# Patient Record
Sex: MALE | Race: WHITE | NOT HISPANIC OR LATINO | Employment: FULL TIME | URBAN - METROPOLITAN AREA
[De-identification: names, ages, dates, MRNs, and addresses within clinical notes are randomized per-mention and may not be internally consistent; named-entity substitution may affect disease eponyms.]

---

## 2017-02-12 DIAGNOSIS — G47.00 INSOMNIA: Primary | ICD-10-CM

## 2017-02-12 DIAGNOSIS — G47.09 OTHER INSOMNIA: ICD-10-CM

## 2017-02-13 NOTE — TELEPHONE ENCOUNTER
zolpidem (AMBIEN) 10 MG tablet      Last Written Prescription Date:  11-21-16  Last Fill Quantity: 15,   # refills: 0  Last Office Visit : 11-21-16  Future Office visit:  none    Kathleen M Doege RN

## 2017-02-14 RX ORDER — ZOLPIDEM TARTRATE 10 MG/1
5 TABLET ORAL
Qty: 15 TABLET | Refills: 0
Start: 2017-02-14 | End: 2017-05-26

## 2017-05-26 ENCOUNTER — OFFICE VISIT (OUTPATIENT)
Dept: FAMILY MEDICINE | Facility: CLINIC | Age: 42
End: 2017-05-26

## 2017-05-26 VITALS
WEIGHT: 183.2 LBS | SYSTOLIC BLOOD PRESSURE: 122 MMHG | DIASTOLIC BLOOD PRESSURE: 83 MMHG | TEMPERATURE: 98 F | OXYGEN SATURATION: 97 % | RESPIRATION RATE: 18 BRPM | HEART RATE: 60 BPM | BODY MASS INDEX: 24.85 KG/M2

## 2017-05-26 DIAGNOSIS — R19.7 DIARRHEA, UNSPECIFIED TYPE: ICD-10-CM

## 2017-05-26 DIAGNOSIS — G47.09 OTHER INSOMNIA: ICD-10-CM

## 2017-05-26 DIAGNOSIS — K42.9 UMBILICAL HERNIA WITHOUT OBSTRUCTION AND WITHOUT GANGRENE: Primary | ICD-10-CM

## 2017-05-26 RX ORDER — ZOLPIDEM TARTRATE 10 MG/1
5 TABLET ORAL
Qty: 15 TABLET | Refills: 0 | Status: SHIPPED | OUTPATIENT
Start: 2017-05-26 | End: 2017-09-28

## 2017-05-26 ASSESSMENT — PAIN SCALES - GENERAL: PAINLEVEL: MILD PAIN (3)

## 2017-05-26 NOTE — PATIENT INSTRUCTIONS
[facility] Medication Refill Request Information:  * Please contact your pharmacy regarding ANY request for medication refills.  ** [facility] Prescription Fax = [phone]  * Please allow 3 business days for routine medication refills.  * Please allow 5 business days for controlled substance medication refills.     [facility] Test Result notification information:  *You will be notified with in 7-10 days of your appointment day regarding the results of your test.  If you are on MyChart you will be notified as soon as the provider has reviewed the results and signed off on them.    [facility] [phone]     General Surgery [phone] (4th Floor [facility] Building)

## 2017-05-26 NOTE — NURSING NOTE
Chief Complaint   Patient presents with     Hernia     Patient is here to discuss a possbile hernia.      Terri Zaldivar LPN at 2:08 PM on 5/26/2017.

## 2017-05-26 NOTE — PROGRESS NOTES
SUBJECTIVE:    Pt is a 41 year old male with pmh of     There is no problem list on file for this patient.      who is here for evaluation of had concerns including Hernia.    Here for a few things.  One, as infant had umbilical hernia, went away on its own. A few years ago had inguinal hernia. Now, he notes small umbilical protrusion--no pain or GI sx with.  Two, since age 18, frequent loose stools, about five times a day, no blood, no other GI sx, no wt loss, no systemic sx, had w/u in [place] before moved here, neg. Might be worse w/ daily, but occurs without dairy too. Tends to stool after eats.   Three, due for lipids. On Zocor.  Four, insomnia, ambien sparing prn helps, tolerated  Five, he has a three year old son (who has umbilical hernia), his son had diarrhea, three days ago Peter got diarrhea w/o other GI or systemic sx, better today    No Known Allergies        Current Outpatient Prescriptions   Medication Sig Dispense Refill     zolpidem (AMBIEN) 10 MG tablet Take 0.5 tablets (5 mg) by mouth nightly as needed for sleep 15 tablet 0     simvastatin (ZOCOR) 10 MG tablet Take 0.5 tablets (5 mg) by mouth At Bedtime Take one at bedtime. 90 tablet 3     hyoscyamine (LEVSIN/SL) 0.125 MG SL tablet Take 1 tablet (0.125 mg) by mouth every 4 hours as needed for cramping 30 tablet 0       Social History   Substance Use Topics     Smoking status: Never Smoker     Smokeless tobacco: Never Used     Alcohol use Yes      Comment: socially           OBJECTIVE:  /83  Pulse 60  Temp 98  F (36.7  C) (Oral)  Resp 18  Wt 83.1 kg (183 lb 3.2 oz)  SpO2 97%  BMI 24.85 kg/m2  GENERAL APPEARANCE: Alert, no acute distress. Well nourished, hydrated  ABDOMEN: soft, no organomegaly, masses or tenderness--very small umbilical hernia  NEURO: Alert, oriented, speech and mentation normal  PSYCHE: mentation appears normal, affect and mood normal    ASSESSMENT/PLAN:    Chronic diarrhea  Sprue tests  Offered GI referral, he just wants  to consider for now    Umbilical hernia: very small, he'd like to discuss w/ G Surg    High chol: labs    Insomnia: continue prn sparing Ambien    Self limited acute diarrhea (on top of chronic loose stools), resolved, likely viral from son, f/u prn            ANIBAL VELIZ MD

## 2017-05-26 NOTE — MR AVS SNAPSHOT
After Visit Summary   5/26/2017    Filippo Saavedra    MRN: [id number]           Patient Information     Date Of Birth          1975        Visit Information        Provider Department      5/26/2017 2:05 PM Douglas Horton MD [facility]        Today's Diagnoses     Umbilical hernia without obstruction and without gangrene    -  1    Other insomnia        Diarrhea, unspecified type          Care Instructions    [facility] Medication Refill Request Information:  * Please contact your pharmacy regarding ANY request for medication refills.  ** [facility] Prescription Fax = [phone]  * Please allow 3 business days for routine medication refills.  * Please allow 5 business days for controlled substance medication refills.     [facility] Test Result notification information:  *You will be notified with in 7-10 days of your appointment day regarding the results of your test.  If you are on MyChart you will be notified as soon as the provider has reviewed the results and signed off on them.    [facility] [phone]     General Surgery [phone] (4th Floor [facility])             Follow-ups after your visit        Additional Services     GENERAL SURG ADULT REFERRAL       Your provider has referred you to: [facility]: General Surgery Clinic [facility] [phone]   [email or web address]    Please be aware that coverage of these services is subject to the terms and limitations of your health insurance plan.  Call member services at your health plan with any benefit or coverage questions.      Please bring the following with you to your appointment:    (1) Any X-Rays, CTs or MRIs which have been performed.  Contact the facility where they were done to arrange for  prior to your scheduled appointment.   (2) List of current medications   (3) This referral request   (4) Any documents/labs given to you for this referral                   Your next 10 appointments already scheduled     May 30, 2017 10:15 AM CDT   LAB with  LAB   [facility] ([facility])    [address]  [address] [place] [address] [phone]           Patient must bring picture ID.  Patient should be prepared to give a urine specimen  Please do not eat 10-12 hours before your appointment if you are coming in fasting for labs on lipids, cholesterol, or glucose (sugar).  Pregnant women should follow their Care Team instructions. Water with medications is okay. Do not drink coffee or other fluids.   If you have concerns about taking  your medications, please ask at office or if scheduling via [employer], send a message by clicking on Secure Messaging, Message Your Care Team.              Future tests that were ordered for you today     Open Future Orders        Priority Expected Expires Ordered    CBC with platelets differential Routine 5/26/2017 6/9/2017 5/26/2017    Tissue transglutaminase rebekah IgA and IgG Routine 5/26/2017 5/26/2018 5/26/2017    Comprehensive metabolic panel Routine 5/26/2017 6/9/2017 5/26/2017    Lipid panel reflex to direct LDL Routine 5/26/2017 6/9/2017 5/26/2017            Who to contact     Please call your clinic at [phone] to:    Ask questions about your health    Make or cancel appointments    Discuss your medicines    Learn about your test results    Speak to your doctor   If you have compliments or concerns about an experience at your clinic, or if you wish to file a complaint, please contact [facility] Patient Relations at [phone] or email us at [email or web address]         Additional Information About Your Visit        [employer] Information     [employer] is an electronic gateway that provides easy, online access to your medical records. With [employer], you can request a clinic appointment, read your test results, renew a prescription or communicate with  your care team.     To sign up for [employer] visit the website at [email or web address]   You will be asked to enter the access code listed below, as well as some personal information. Please follow the directions to create your username and password.     Your access code is: [email or web address]  Expires: 2017  6:30 AM     Your access code will  in 90 days. If you need help or a new code, please contact your [facility] or call [phone] for assistance.        Care EveryWhere ID     This is your Care EveryWhere ID. This could be used by other organizations to access your Donna medical records  [id number]        Your Vitals Were     Pulse Temperature Respirations Pulse Oximetry BMI (Body Mass Index)       60 98  F (36.7  C) (Oral) 18 97% 24.85 kg/m2        Blood Pressure from Last 3 Encounters:   17 122/83   16 121/84   05/15/15 110/74    Weight from Last 3 Encounters:   17 83.1 kg (183 lb 3.2 oz)   16 83 kg (183 lb)   05/15/15 82.5 kg (181 lb 14.4 oz)              We Performed the Following     Endomysial Antibody IgA by [facility]     GENERAL SURG ADULT REFERRAL          Today's Medication Changes          These changes are accurate as of: 17  2:41 PM.  If you have any questions, ask your nurse or doctor.               Stop taking these medicines if you haven't already. Please contact your care team if you have questions.     azithromycin 250 MG tablet   Commonly known as:  ZITHROMAX   Stopped by:  Douglas Horton MD                Where to get your medicines      Some of these will need a paper prescription and others can be bought over the counter.  Ask your nurse if you have questions.     Bring a paper prescription for each of these medications     zolpidem 10 MG tablet                Primary Care Provider Office Phone # Fax #    Douglas Horton [phone]       [facility] [address] [place] [address]  [id number]        Thank you!     Thank you for choosing [facility]  for your care. Our goal is always to provide you with excellent care. Hearing back from our patients is one way we can continue to improve our services. Please take a few minutes to complete the written survey that you may receive in the mail after your visit with us. Thank you!             Your Updated Medication List - Protect others around you: Learn how to safely use, store and throw away your medicines at www.disposemymeds.org.          This list is accurate as of: 5/26/17  2:41 PM.  Always use your most recent med list.                   Brand Name Dispense Instructions for use    hyoscyamine 0.125 MG sublingual tablet    LEVSIN/SL    30 tablet    Take 1 tablet (0.125 mg) by mouth every 4 hours as needed for cramping       simvastatin 10 MG tablet    ZOCOR    90 tablet    Take 0.5 tablets (5 mg) by mouth At Bedtime Take one at bedtime.       zolpidem 10 MG tablet    AMBIEN    15 tablet    Take 0.5 tablets (5 mg) by mouth nightly as needed for sleep

## 2017-05-30 DIAGNOSIS — R19.7 DIARRHEA, UNSPECIFIED TYPE: ICD-10-CM

## 2017-05-30 LAB
ALBUMIN SERPL-MCNC: 4.1 G/DL (ref 3.4–5)
ALP SERPL-CCNC: 85 U/L (ref 40–150)
ALT SERPL W P-5'-P-CCNC: 29 U/L (ref 0–70)
ANION GAP SERPL CALCULATED.3IONS-SCNC: 6 MMOL/L (ref 3–14)
AST SERPL W P-5'-P-CCNC: 24 U/L (ref 0–45)
BASOPHILS # BLD AUTO: 0.1 10E9/L (ref 0–0.2)
BASOPHILS NFR BLD AUTO: 0.8 %
BILIRUB SERPL-MCNC: 0.4 MG/DL (ref 0.2–1.3)
BUN SERPL-MCNC: 12 MG/DL (ref 7–30)
CALCIUM SERPL-MCNC: 9 MG/DL (ref 8.5–10.1)
CHLORIDE SERPL-SCNC: 105 MMOL/L (ref 94–109)
CHOLEST SERPL-MCNC: 123 MG/DL
CO2 SERPL-SCNC: 30 MMOL/L (ref 20–32)
CREAT SERPL-MCNC: 1.05 MG/DL (ref 0.66–1.25)
DIFFERENTIAL METHOD BLD: NORMAL
EOSINOPHIL # BLD AUTO: 0.2 10E9/L (ref 0–0.7)
EOSINOPHIL NFR BLD AUTO: 2.5 %
ERYTHROCYTE [DISTWIDTH] IN BLOOD BY AUTOMATED COUNT: 13 % (ref 10–15)
GFR SERPL CREATININE-BSD FRML MDRD: 77 ML/MIN/1.7M2
GLUCOSE SERPL-MCNC: 95 MG/DL (ref 70–99)
HCT VFR BLD AUTO: 47.4 % (ref 40–53)
HDLC SERPL-MCNC: 37 MG/DL
HGB BLD-MCNC: 15.6 G/DL (ref 13.3–17.7)
IMM GRANULOCYTES # BLD: 0 10E9/L (ref 0–0.4)
IMM GRANULOCYTES NFR BLD: 0.3 %
LDLC SERPL CALC-MCNC: 72 MG/DL
LYMPHOCYTES # BLD AUTO: 2.6 10E9/L (ref 0.8–5.3)
LYMPHOCYTES NFR BLD AUTO: 43.5 %
MCH RBC QN AUTO: 28.7 PG (ref 26.5–33)
MCHC RBC AUTO-ENTMCNC: 32.9 G/DL (ref 31.5–36.5)
MCV RBC AUTO: 87 FL (ref 78–100)
MONOCYTES # BLD AUTO: 0.4 10E9/L (ref 0–1.3)
MONOCYTES NFR BLD AUTO: 6.4 %
NEUTROPHILS # BLD AUTO: 2.8 10E9/L (ref 1.6–8.3)
NEUTROPHILS NFR BLD AUTO: 46.5 %
NONHDLC SERPL-MCNC: 85 MG/DL
NRBC # BLD AUTO: 0 10*3/UL
NRBC BLD AUTO-RTO: 0 /100
PLATELET # BLD AUTO: 239 10E9/L (ref 150–450)
POTASSIUM SERPL-SCNC: 4 MMOL/L (ref 3.4–5.3)
PROT SERPL-MCNC: 7.2 G/DL (ref 6.8–8.8)
RBC # BLD AUTO: 5.43 10E12/L (ref 4.4–5.9)
SODIUM SERPL-SCNC: 141 MMOL/L (ref 133–144)
TRIGL SERPL-MCNC: 65 MG/DL
WBC # BLD AUTO: 6.1 10E9/L (ref 4–11)

## 2017-05-31 LAB
TTG IGA SER-ACNC: NORMAL U/ML
TTG IGG SER-ACNC: NORMAL U/ML

## 2017-06-02 LAB — ENDOMYSIUM IGA TITR SER IF: NORMAL {TITER}

## 2017-06-05 ENCOUNTER — TELEPHONE (OUTPATIENT)
Dept: SURGERY | Facility: CLINIC | Age: 42
End: 2017-06-05

## 2017-06-05 NOTE — TELEPHONE ENCOUNTER
Pre Visit Call and Assessment    Date of call:  06/05/2017    Phone numbers:  Home number on file [phone] (home)    Reached patient/confirmed appointment:  Yes  Patient care team/Primary provider:  Douglas Horton  Preferred outpatient pharmacy:    [facility] [address]  AT [facility]   [address]  [place] [address]  Phone: [phone] Fax: [phone]    Referred to:  Dr. Dirk Rios    Reason for visit:  Umbilical hernia     Problem List:  There is no problem list on file for this patient.      Allergies:   No Known Allergies    History:      No past medical history on file.    Past Surgical History:   Procedure Laterality Date     HERNIA REPAIR         No family history on file.    Social History   Substance Use Topics     Smoking status: Never Smoker     Smokeless tobacco: Never Used     Alcohol use Yes      Comment: socially       Patient instructions:    *  Bring outside medical records, images/disc, and/or studies

## 2017-06-06 ENCOUNTER — OFFICE VISIT (OUTPATIENT)
Dept: SURGERY | Facility: CLINIC | Age: 42
End: 2017-06-06

## 2017-06-06 VITALS
HEIGHT: 72 IN | SYSTOLIC BLOOD PRESSURE: 129 MMHG | HEART RATE: 73 BPM | DIASTOLIC BLOOD PRESSURE: 81 MMHG | WEIGHT: 183.6 LBS | TEMPERATURE: 99.4 F | OXYGEN SATURATION: 96 % | BODY MASS INDEX: 24.87 KG/M2

## 2017-06-06 DIAGNOSIS — K42.9 UMBILICAL HERNIA WITHOUT OBSTRUCTION AND WITHOUT GANGRENE: Primary | ICD-10-CM

## 2017-06-06 ASSESSMENT — PAIN SCALES - GENERAL: PAINLEVEL: NO PAIN (0)

## 2017-06-06 NOTE — PATIENT INSTRUCTIONS
Open Umbilical Hernia Repair Teaching Sheet      1.  Wound care:  Remove the gauze dressing 24 hours after surgery but leave the medical tape in place in place.  The tape should stay on for 5-7 days.  You may remove the Steri-Strips after one week.   Please wash your hands before touching your incisions or removing dressings.    2.  You may resume all of your home medications after surgery.  Please do not start Aspirin or blood thinners until the day after surgery.    3.  You may shower 24 hours after surgery.  Do not submerge yourself in water for 7 days (bath, whirlpool, hot tub, pool, lake, etc).      4.  Restrictions:  No lifting in excess of 20 pounds for 3-4 weeks from date of surgery.    5.  Pain management:  Apply ice pack to incision area for 24 hours protecting the skin with a cloth.  Take prescribed pain medication as directed and only as needed.  Please do not take any additional Acetaminophen or Tylenol products while taking narcotic pain medications.  We encourage the use of Ibuprofen, Advil, or Motrin after surgery except if you have an allergy, ulcer, kidney problems, or it is contraindicated by a provider.  A TAP Block may be recommended the day of surgery (see information sheet below).    6.  Our office will call you 2-4 days after your procedure to review post-op teaching, answer questions, and help arrange after surgery care.    7.  Watch for signs of infection:  Redness of the wound, drainage, increasing pain, and/or fever/chills (greater than 101 degrees F).    8.  Constipation:  Please take prescribed stool softener as directed.  You may stop taking it when you are no longer taking narcotic pain medications and your bowels have returned to normal.  If you become constipated it is safe to take an over-the-counter laxative as directed on the bottle.    9.  Driving:  You may drive  when you are no longer taking prescription pain medications  and you feel comfortable operating a vehicle.       10.  Diet:  You may resume your regular diet after surgery.      If you have questions or concerns please contact our office Monday through Friday during regular office hours at [phone].  If you are calling during nonbusiness hours, the weekend, or holiday please call the hospital  at [phone] and ask for the on-call General Surgeon.                Transversus Abdominis Plane (TAP) Pain Block    What is a TAP block?    A TAP block is a regional anesthesia procedure that can help you manage your pain after surgery.  TAP stands for Transversus Abdominis Plane, which is a muscle layer in your abdomen.  The TAP block numbs or blocks pain near the site of your surgery.      The numbing medication is similar to  Novocaine  which is often used by dentists.  After the procedure your abdomen may feel numb and will help you feel more comfortable after surgery.      What is the purpose of a nerve block?    To better manage your pain after surgery and to keep it from getting severe and out of control.    To block pain signals from the nerve and to help decrease after surgery pain.    To help you sleep, easily breathe deeply, walk, and visit with others.    How is the procedure done?    The block is an injection with a small needle in a muscle layer of your abdomen.  It is usually performed right before surgery in the Preoperative area.  It sometimes is done with sedation medication to lessen the pain of the injection.      It is performed with the use of an ultrasound machine, which assists the provider in seeing the correct muscle layer of your abdomen. The procedure takes between 5 and 15 minutes.    For some people it can be placed after you go to sleep in the Operating Room or at the end of surgery. It just depends on the type of surgery and your situation    What can I expect?    You may experience numbness, tingling, or a feeling of heaviness in your abdomen    Depending on the medication used you may  experience pain control for up to 72 hours after surgery    The TAP block typically does not relieve all of your surgical pain but decreases pain between 50-75% of what you typically would feel after surgery    Let your nurse know if you experience   o Numbness or tingling in areas other than where the injection was  o Blurry vision  o Ringing in your ears  o A metallic taste in your mouth

## 2017-06-06 NOTE — NURSING NOTE
No chief complaint on file.      Vitals:    06/06/17 1519   BP: 129/81   BP Location: Left arm   Patient Position: Chair   Cuff Size: Adult Regular   Pulse: 73   Temp: 99.4  F (37.4  C)   SpO2: 96%   Weight: 183 lb 9.6 oz   Height: 6'       Body mass index is 24.9 kg/(m^2).    Deandre Almaraz MA

## 2017-06-06 NOTE — PROGRESS NOTES
Filippo Saavedra is a 41 year old male with a 1 month history of an umbilical hernia with the following symptoms of lump.    Finding was not work related.  Onset did not occur with lifting.  Obstructive symptoms:  no  Urinary difficulties:  no  Chronic cough: no  Constipation:  no  Current level of activity:  Low, walks, has 3 year old at home.    Past medical history, medications, allergies, family history, and social history were reviewed with the patient. S/p primary repair inguinal hernia some years ago in [place]. Works as  at [employer].    ROS: 10 point review of systems negative except noted in HPI  PHYSICAL EXAM  General appearance- healthy, alert, and in no distress.  Skin- Skin color, texture, and turgor normal.  No rashes.  Neck- Neck is supple with no obvious adenopathy.  Lungs- Respiratory effort unlabored.  Gait- Normal.  Abdomen - soft non tender with small umbilical hernia  Impression:  Hernia, umbilical  Recommendation:  open surgery when convenient.    A full discussion regarding the alternatives, risks, goals, and potential complications for this surgery was completed today.  The patient understood that the potential problems included but are not limited to:  Infection, bleeding, hematoma, seroma, and recurrence.    The patient verbally expressed understanding, was given the opportunity for questions, and will hold off until later in summer.     Today the patient was instructed on the need for a preoperative H&P, NPO status prior to surgery, and the need to stop anticoagulants prior to surgery.  Additional educational material, soap, and instructions will be mailed out to the patients home.     The total time spent with this patient was 30 minutes.  Of this time, greater than 50% was spent counseling and coordinating care.      Lovenox:  No

## 2017-06-06 NOTE — MR AVS SNAPSHOT
After Visit Summary   6/6/2017    Filippo Saavedra    MRN: [id number]           Patient Information     Date Of Birth          1975        Visit Information        Provider Department      6/6/2017 3:30 PM Dirk Rios MD [facility]        Care Instructions    Open Umbilical Hernia Repair Teaching Sheet      1.  Wound care:  Remove the gauze dressing 24 hours after surgery but leave the medical tape in place in place.  The tape should stay on for 5-7 days.  You may remove the Steri-Strips after one week.   Please wash your hands before touching your incisions or removing dressings.    2.  You may resume all of your home medications after surgery.  Please do not start Aspirin or blood thinners until the day after surgery.    3.  You may shower 24 hours after surgery.  Do not submerge yourself in water for 7 days (bath, whirlpool, hot tub, pool, lake, etc).      4.  Restrictions:  No lifting in excess of 20 pounds for 3-4 weeks from date of surgery.    5.  Pain management:  Apply ice pack to incision area for 24 hours protecting the skin with a cloth.  Take prescribed pain medication as directed and only as needed.  Please do not take any additional Acetaminophen or Tylenol products while taking narcotic pain medications.  We encourage the use of Ibuprofen, Advil, or Motrin after surgery except if you have an allergy, ulcer, kidney problems, or it is contraindicated by a provider.  A TAP Block may be recommended the day of surgery (see information sheet below).    6.  Our office will call you 2-4 days after your procedure to review post-op teaching, answer questions, and help arrange after surgery care.    7.  Watch for signs of infection:  Redness of the wound, drainage, increasing pain, and/or fever/chills (greater than 101 degrees F).    8.  Constipation:  Please take prescribed stool softener as directed.  You may stop taking it when you are no longer taking narcotic pain  medications and your bowels have returned to normal.  If you become constipated it is safe to take an over-the-counter laxative as directed on the bottle.    9.  Driving:  You may drive  when you are no longer taking prescription pain medications  and you feel comfortable operating a vehicle.       10. Diet:  You may resume your regular diet after surgery.      If you have questions or concerns please contact our office Monday through Friday during regular office hours at [phone].  If you are calling during nonbusiness hours, the weekend, or holiday please call the hospital  at [phone] and ask for the on-call General Surgeon.                Transversus Abdominis Plane (TAP) Pain Block    What is a TAP block?    A TAP block is a regional anesthesia procedure that can help you manage your pain after surgery.  TAP stands for Transversus Abdominis Plane, which is a muscle layer in your abdomen.  The TAP block numbs or blocks pain near the site of your surgery.      The numbing medication is similar to  Novocaine  which is often used by dentists.  After the procedure your abdomen may feel numb and will help you feel more comfortable after surgery.      What is the purpose of a nerve block?    To better manage your pain after surgery and to keep it from getting severe and out of control.    To block pain signals from the nerve and to help decrease after surgery pain.    To help you sleep, easily breathe deeply, walk, and visit with others.    How is the procedure done?    The block is an injection with a small needle in a muscle layer of your abdomen.  It is usually performed right before surgery in the Preoperative area.  It sometimes is done with sedation medication to lessen the pain of the injection.      It is performed with the use of an ultrasound machine, which assists the provider in seeing the correct muscle layer of your abdomen. The procedure takes between 5 and 15 minutes.    For some people  it can be placed after you go to sleep in the Operating Room or at the end of surgery. It just depends on the type of surgery and your situation    What can I expect?    You may experience numbness, tingling, or a feeling of heaviness in your abdomen    Depending on the medication used you may experience pain control for up to 72 hours after surgery    The TAP block typically does not relieve all of your surgical pain but decreases pain between 50-75% of what you typically would feel after surgery    Let your nurse know if you experience   o Numbness or tingling in areas other than where the injection was  o Blurry vision  o Ringing in your ears  o A metallic taste in your mouth              Follow-ups after your visit        Who to contact     Please call your clinic at [phone] to:    Ask questions about your health    Make or cancel appointments    Discuss your medicines    Learn about your test results    Speak to your doctor   If you have compliments or concerns about an experience at your clinic, or if you wish to file a complaint, please contact [facility] Patient Relations at [phone] or email us at [email or web address]         Additional Information About Your Visit        [employer] Information     [employer] is an electronic gateway that provides easy, online access to your medical records. With [employer], you can request a clinic appointment, read your test results, renew a prescription or communicate with your care team.     To sign up for [employer] visit the website at [email or web address]   You will be asked to enter the access code listed below, as well as some personal information. Please follow the directions to create your username and password.     Your access code is: [email or web address]  Expires: 2017  6:30 AM     Your access code will  in 90 days. If you need help or a new code, please contact your [facility] or call  [phone] for assistance.        Care EveryWhere ID     This is your Care EveryWhere ID. This could be used by other organizations to access your [facility] medical records  [id number]        Your Vitals Were     Pulse Temperature Height Pulse Oximetry BMI (Body Mass Index)       73 99.4  F (37.4  C) 6' 96% 24.9 kg/m2        Blood Pressure from Last 3 Encounters:   06/06/17 129/81   05/26/17 122/83   11/21/16 121/84    Weight from Last 3 Encounters:   06/06/17 183 lb 9.6 oz   05/26/17 183 lb 3.2 oz   11/21/16 183 lb              Today, you had the following     No orders found for display       Primary Care Provider Office Phone # Fax #    Douglas Horton [phone]       [facility] [address] [place] [id number]        Thank you!     Thank you for choosing [facility]  for your care. Our goal is always to provide you with excellent care. Hearing back from our patients is one way we can continue to improve our services. Please take a few minutes to complete the written survey that you may receive in the mail after your visit with us. Thank you!             Your Updated Medication List - Protect others around you: Learn how to safely use, store and throw away your medicines at www.disposemymeds.org.          This list is accurate as of: 6/6/17  3:38 PM.  Always use your most recent med list.                   Brand Name Dispense Instructions for use    hyoscyamine 0.125 MG sublingual tablet    LEVSIN/SL    30 tablet    Take 1 tablet (0.125 mg) by mouth every 4 hours as needed for cramping       simvastatin 10 MG tablet    ZOCOR    90 tablet    Take 0.5 tablets (5 mg) by mouth At Bedtime Take one at bedtime.       zolpidem 10 MG tablet    AMBIEN    15 tablet    Take 0.5 tablets (5 mg) by mouth nightly as needed for sleep

## 2017-06-06 NOTE — LETTER
6/6/2017       RE: Fiilppo Saavedra  [address] [facility] [address]     Dear Colleague,    Thank you for referring your patient, Filippo Saavedra, to the [facility] GENERAL SURGERY at [facility]. Please see a copy of my visit note below.    Filippo Saavedra is a 41 year old male with a 1 month history of an umbilical hernia with the following symptoms of lump.    Finding was not work related.  Onset did not occur with lifting.  Obstructive symptoms:  no  Urinary difficulties:  no  Chronic cough: no  Constipation:  no  Current level of activity:  Low, walks, has 3 year old at home.    Past medical history, medications, allergies, family history, and social history were reviewed with the patient. S/p primary repair inguinal hernia some years ago in [place]. Works as  at [employer].    ROS: 10 point review of systems negative except noted in HPI  PHYSICAL EXAM  General appearance- healthy, alert, and in no distress.  Skin- Skin color, texture, and turgor normal.  No rashes.  Neck- Neck is supple with no obvious adenopathy.  Lungs- Respiratory effort unlabored.  Gait- Normal.  Abdomen - soft non tender with small umbilical hernia  Impression:  Hernia, umbilical  Recommendation:  open surgery when convenient.    A full discussion regarding the alternatives, risks, goals, and potential complications for this surgery was completed today.  The patient understood that the potential problems included but are not limited to:  Infection, bleeding, hematoma, seroma, and recurrence.    The patient verbally expressed understanding, was given the opportunity for questions, and will hold off until later in summer.     Today the patient was instructed on the need for a preoperative H&P, NPO status prior to surgery, and the need to stop anticoagulants prior to surgery.  Additional educational material, soap, and instructions will be mailed out to the patients home.     The total time spent with this  patient was 30 minutes.  Of this time, greater than 50% was spent counseling and coordinating care.      Lovenox:  No          Again, thank you for allowing me to participate in the care of your patient.      Sincerely,    Dirk Rios MD

## 2017-09-28 DIAGNOSIS — G47.09 OTHER INSOMNIA: ICD-10-CM

## 2017-09-29 RX ORDER — ZOLPIDEM TARTRATE 10 MG/1
5 TABLET ORAL
Qty: 15 TABLET | Refills: 0
Start: 2017-09-29 | End: 2018-01-12

## 2017-10-05 ENCOUNTER — CARE COORDINATION (OUTPATIENT)
Dept: SURGERY | Facility: CLINIC | Age: 42
End: 2017-10-05

## 2017-10-05 NOTE — PROGRESS NOTES
Patient met with Dr. Rios in June regarding an umbilical hernia.  Patient wanted to delay repair until the end of the summer.  Attempted to contact patient to inquire if he would like to schedule repair.  Briefly spoke with patient and he was unable to discuss at this time.

## 2018-01-12 DIAGNOSIS — G47.09 OTHER INSOMNIA: ICD-10-CM

## 2018-01-12 RX ORDER — ZOLPIDEM TARTRATE 10 MG/1
5 TABLET ORAL
Qty: 15 TABLET | Refills: 0
Start: 2018-01-12 | End: 2018-04-11

## 2018-01-15 DIAGNOSIS — E78.00 HIGH CHOLESTEROL: ICD-10-CM

## 2018-01-15 RX ORDER — SIMVASTATIN 10 MG
5 TABLET ORAL AT BEDTIME
Qty: 90 TABLET | Refills: 3 | Status: SHIPPED | OUTPATIENT
Start: 2018-01-15 | End: 2019-02-05

## 2018-04-11 DIAGNOSIS — G47.09 OTHER INSOMNIA: ICD-10-CM

## 2018-04-11 RX ORDER — ZOLPIDEM TARTRATE 10 MG/1
5 TABLET ORAL
Qty: 15 TABLET | Refills: 0
Start: 2018-04-11 | End: 2018-07-11

## 2018-07-11 DIAGNOSIS — G47.09 OTHER INSOMNIA: ICD-10-CM

## 2018-07-11 RX ORDER — ZOLPIDEM TARTRATE 10 MG/1
5 TABLET ORAL
Qty: 15 TABLET | Refills: 0
Start: 2018-07-11 | End: 2019-12-05

## 2019-02-05 DIAGNOSIS — E78.00 HIGH CHOLESTEROL: ICD-10-CM

## 2019-02-05 RX ORDER — SIMVASTATIN 10 MG
5 TABLET ORAL AT BEDTIME
Qty: 90 TABLET | Refills: 3 | Status: SHIPPED | OUTPATIENT
Start: 2019-02-05 | End: 2019-12-05

## 2019-12-05 ENCOUNTER — OFFICE VISIT (OUTPATIENT)
Dept: FAMILY MEDICINE | Facility: CLINIC | Age: 44
End: 2019-12-05
Payer: COMMERCIAL

## 2019-12-05 VITALS
WEIGHT: 180 LBS | OXYGEN SATURATION: 99 % | TEMPERATURE: 98.4 F | SYSTOLIC BLOOD PRESSURE: 138 MMHG | HEART RATE: 75 BPM | DIASTOLIC BLOOD PRESSURE: 89 MMHG | BODY MASS INDEX: 24.41 KG/M2

## 2019-12-05 DIAGNOSIS — K21.9 GASTROESOPHAGEAL REFLUX DISEASE WITHOUT ESOPHAGITIS: Primary | ICD-10-CM

## 2019-12-05 DIAGNOSIS — G47.09 OTHER INSOMNIA: ICD-10-CM

## 2019-12-05 DIAGNOSIS — E78.00 HIGH CHOLESTEROL: ICD-10-CM

## 2019-12-05 DIAGNOSIS — K58.9 IRRITABLE BOWEL SYNDROME, UNSPECIFIED TYPE: ICD-10-CM

## 2019-12-05 RX ORDER — ZOLPIDEM TARTRATE 10 MG/1
5 TABLET ORAL
Qty: 15 TABLET | Refills: 0 | Status: SHIPPED | OUTPATIENT
Start: 2019-12-05 | End: 2020-04-30

## 2019-12-05 RX ORDER — SIMVASTATIN 10 MG
5 TABLET ORAL AT BEDTIME
Qty: 90 TABLET | Refills: 3 | Status: SHIPPED | OUTPATIENT
Start: 2019-12-05 | End: 2020-12-08

## 2019-12-05 ASSESSMENT — PAIN SCALES - GENERAL: PAINLEVEL: NO PAIN (0)

## 2019-12-05 NOTE — PROGRESS NOTES
[facility]  Primary Care Center   Douglas Horton MD  12/05/2019      Chief Complaint:   Medication Request       History of Present Illness:   Filippo Saavedra is a 44 year old male with a history of insomnia, gastroesophageal reflux disease, and IBS who presents for a medication recheck. He brought blood work with him from this past May. His total cholesterol was 184, HDL 31, , Triglycerides 122, and Glucose 82 mg/dL. He continues to take simvastatin 5mg. He is not getting as much exercise as usual. He reports he is better about exercising during the summer because he is able to get outside, but has been struggling to keep up with it in the winter. He adds that his diet is okay, but could be better. His weight has been steady. He still has an umbilical hernia, although it only bothers him if he pokes it. He has decided to hold off on surgery. He occasionally has insomnia and takes Zolpidem 1-2x per month, although sometimes for 4-5 days at a time. He would like a refill of this prescription. Filippo reports he tried to wean himself off of the Zantac, but experienced severe chest pain due to heart burn, so is now taking it again. He occasionally takes Levsin for his IBS, which he has had since his 20s, and he would like a refill of this prescription. He notes that is mildly lactose intolerant, but his symptoms resolve 1-2 hours after eating     Review of Systems:   Pertinent items are noted in HPI or as in patient entered ROS below, remainder of complete ROS is negative.     Active Medications:       hyoscyamine (LEVSIN/SL) 0.125 MG sublingual tablet, Take 1 tablet (0.125 mg) by mouth every 4 hours as needed for cramping, Disp: 30 tablet, Rfl: 0     ranitidine (ZANTAC) 150 MG tablet, Take 150 mg by mouth, Disp: , Rfl:      simvastatin (ZOCOR) 10 MG tablet, Take 0.5 tablets (5 mg) by mouth At Bedtime Take one at bedtime., Disp: 90 tablet, Rfl: 3     zolpidem (AMBIEN) 10 MG tablet, Take 0.5 tablets (5 mg) by  mouth nightly as needed for sleep Patient needs to see primary provider for further refills., Disp: 15 tablet, Rfl: 0      Allergies:   Patient has no known allergies.      Past Medical History:  Umbilical hernia  Insomnia  Irritable bowel syndrome  Gastroesophageal reflux disease      Past Surgical History:  Hernia repair x1, inguinal    Family History:    Filippo reports that his Dad had an MI in his late 50s/early 60s. He was a smoker, but has been okay since the heart attack. Filippo's mom had ovarian cancer, but is healthy now. He has one older brother with psoriasis arthritis, so he has a lot of other health issues, including weight management and hypertension     Social History:   The patient is , a nonsmoker, and does socially consume alcohol. Filippo is a  and has a six year old son     Physical Exam:   /89 (BP Location: Left arm, Patient Position: Sitting, Cuff Size: Adult Regular)   Pulse 75   Temp 98.4  F (36.9  C) (Oral)   Wt 81.6 kg (180 lb)   SpO2 99%   BMI 24.41 kg/m     Constitutional: Alert. In no distress.  Head: Normocephalic.   ENT: Mucous membranes are moist.   Respiratory: Normal rate and effort.  Musculoskeletal: No gross deformities noted.   Psychiatric: Mentation appears normal. Normal affect.        Assessment and Plan:  Other insomnia  Uses sparingly with good relief  - zolpidem (AMBIEN) 10 MG tablet  Dispense: 15 tablet; Refill: 0    High cholesterol  He will work harder on diet and exercise. With his family history, he knows he needs to keep an eye on everything for cardiac risks  - simvastatin (ZOCOR) 10 MG tablet  Dispense: 90 tablet; Refill: 3    IBS (irritable bowel syndrome)  I did offer a GI referral as his symptoms go back to age 18, in case something could be done about this. He will think about it.  - hyoscyamine (LEVSIN/SL) 0.125 MG sublingual tablet  Dispense: 30 tablet; Refill: 0     Reflux  He has been using Zantac, I suggested he switch to  Pepcid    Follow-up: Return in about 1 year (around 12/5/2020).         Scribe Disclosure:  I, Mony Devika, am serving as a scribe to document services personally performed by Douglas Horton MD at this visit, based upon the provider's statements to me. All documentation has been reviewed by the aforementioned provider prior to being entered into the official medical record.       Portions of this medical record were completed by a scribe. UPON MY REVIEW AND AUTHENTICATION BY ELECTRONIC SIGNATURE, this confirms (a) I performed the applicable clinical services, and (b) the record is accurate.   LUKE Hudson

## 2019-12-05 NOTE — PATIENT INSTRUCTIONS
[facility] Medication Refill Request Information:  * Please contact your pharmacy regarding ANY request for medication refills.  ** [facility] Prescription Fax = [phone]  * Please allow 3 business days for routine medication refills.  * Please allow 5 business days for controlled substance medication refills.     [facility] Test Result notification information:  *You will be notified with in 7-10 days of your appointment day regarding the results of your test.  If you are on MyChart you will be notified as soon as the provider has reviewed the results and signed off on them.    [facility]: [phone]

## 2019-12-05 NOTE — NURSING NOTE
Chief Complaint   Patient presents with     Medication Request     pt here for medication refills       Jasmin Solano CMA at 2:23 PM on 12/5/2019.

## 2020-04-30 DIAGNOSIS — G47.09 OTHER INSOMNIA: ICD-10-CM

## 2020-04-30 RX ORDER — ZOLPIDEM TARTRATE 10 MG/1
5 TABLET ORAL
Qty: 15 TABLET | Refills: 0 | Status: SHIPPED | OUTPATIENT
Start: 2020-04-30 | End: 2022-06-08

## 2020-04-30 NOTE — TELEPHONE ENCOUNTER
Patient Requested  Ativan  Last Filled  12/05/2019  Last Office Visit  12/05/2019  Next Office Visit     Checked  04/30/2020    DX:     Pharmacy:     MAUDE BARNETT CMA at 10:26 AM on 4/30/2020.

## 2020-10-10 ENCOUNTER — IMMUNIZATION (OUTPATIENT)
Dept: NURSING | Facility: CLINIC | Age: 45
End: 2020-10-10
Payer: COMMERCIAL

## 2020-10-10 PROCEDURE — 90471 IMMUNIZATION ADMIN: CPT

## 2020-10-10 PROCEDURE — 90686 IIV4 VACC NO PRSV 0.5 ML IM: CPT

## 2020-12-07 ENCOUNTER — TELEPHONE (OUTPATIENT)
Dept: FAMILY MEDICINE | Facility: CLINIC | Age: 45
End: 2020-12-07

## 2020-12-07 DIAGNOSIS — E78.00 HIGH CHOLESTEROL: ICD-10-CM

## 2020-12-08 RX ORDER — SIMVASTATIN 10 MG
5 TABLET ORAL AT BEDTIME
Qty: 90 TABLET | Refills: 0 | Status: SHIPPED | OUTPATIENT
Start: 2020-12-08 | End: 2021-06-14

## 2020-12-08 NOTE — TELEPHONE ENCOUNTER
simvastatin (ZOCOR) 10 MG tablet  Take 0.5 tablets (5 mg) by mouth At Bedtime   Last Written Prescription Date:  12/5/2019  Last Fill Quantity: 90,   # refills: 3  Last Office Visit : 12/5/2019  Future Office visit:  None    Routing refill request to provider for review LDL last done 2017, annual appt due December 2020. 90 day sent

## 2021-06-11 DIAGNOSIS — E78.00 HIGH CHOLESTEROL: ICD-10-CM

## 2021-06-14 NOTE — TELEPHONE ENCOUNTER
simvastatin (ZOCOR) 10 MG tablet   Last Written Prescription Date:  12/8/2020  Last Fill Quantity: 90,   # refills: 0  Last Office Visit : 12/5/2019  Future Office visit:  None    Routing refill request to provider for review/approval because:  Second Request  Clarify order.   Is Pt taking a 1/2 tab daily?  Or taking 1 whole tablet daily?  In the chart in the order.  There is 2 sets of direction for the med.    Over due office visit and Labs    Dec 2019??  30 day pended clinic notified       Griselda Beaver RN  Central Triage Red Flags/Med Refills

## 2021-06-15 RX ORDER — SIMVASTATIN 10 MG
5 TABLET ORAL AT BEDTIME
Qty: 90 TABLET | Refills: 0 | Status: SHIPPED | OUTPATIENT
Start: 2021-06-15 | End: 2022-05-17

## 2021-07-19 DIAGNOSIS — G47.09 OTHER INSOMNIA: ICD-10-CM

## 2021-07-19 RX ORDER — ZOLPIDEM TARTRATE 10 MG/1
5 TABLET ORAL
Qty: 15 TABLET | Refills: 0 | Status: CANCELLED | OUTPATIENT
Start: 2021-07-19

## 2021-07-20 NOTE — TELEPHONE ENCOUNTER
Message left that patient needs to be seen for refills.    MAUDE BARNETT CMA at 8:15 AM on 7/20/2021.

## 2022-05-13 ENCOUNTER — MYC REFILL (OUTPATIENT)
Dept: FAMILY MEDICINE | Facility: CLINIC | Age: 47
End: 2022-05-13
Payer: COMMERCIAL

## 2022-05-13 DIAGNOSIS — E78.00 HIGH CHOLESTEROL: ICD-10-CM

## 2022-05-15 DIAGNOSIS — E78.00 HIGH CHOLESTEROL: ICD-10-CM

## 2022-05-16 RX ORDER — SIMVASTATIN 10 MG
5 TABLET ORAL AT BEDTIME
Qty: 30 TABLET | Status: CANCELLED | OUTPATIENT
Start: 2022-05-16

## 2022-05-16 NOTE — TELEPHONE ENCOUNTER
simvastatin (ZOCOR) 10 MG tablet      Last Written Prescription Date:  6/15/21  Last Fill Quantity: 90,   # refills: 0  Last Office Visit : 12/5/19  Future Office visit:  6/8/22    Routing refill request to provider for review/approval because:  >18 months since last visit  Overdue for LDL  Lab Test 05/30/17  1045   LDL 72     Nothing more recent in care everywhere nor media  Gap in therapy?  Taking?

## 2022-05-17 RX ORDER — SIMVASTATIN 10 MG
TABLET ORAL
Qty: 45 TABLET | Refills: 0 | Status: SHIPPED | OUTPATIENT
Start: 2022-05-17 | End: 2022-08-19

## 2022-06-08 ENCOUNTER — OFFICE VISIT (OUTPATIENT)
Dept: FAMILY MEDICINE | Facility: CLINIC | Age: 47
End: 2022-06-08
Payer: COMMERCIAL

## 2022-06-08 VITALS
SYSTOLIC BLOOD PRESSURE: 135 MMHG | OXYGEN SATURATION: 97 % | HEART RATE: 70 BPM | DIASTOLIC BLOOD PRESSURE: 85 MMHG | BODY MASS INDEX: 25.71 KG/M2 | HEIGHT: 72 IN | WEIGHT: 189.8 LBS

## 2022-06-08 DIAGNOSIS — G47.09 OTHER INSOMNIA: ICD-10-CM

## 2022-06-08 DIAGNOSIS — E78.00 HIGH CHOLESTEROL: ICD-10-CM

## 2022-06-08 DIAGNOSIS — K58.9 IRRITABLE BOWEL SYNDROME, UNSPECIFIED TYPE: ICD-10-CM

## 2022-06-08 DIAGNOSIS — R21 RASH: ICD-10-CM

## 2022-06-08 DIAGNOSIS — Z00.00 HEALTH CARE MAINTENANCE: Primary | ICD-10-CM

## 2022-06-08 DIAGNOSIS — K58.0 IRRITABLE BOWEL SYNDROME WITH DIARRHEA: ICD-10-CM

## 2022-06-08 PROCEDURE — 90715 TDAP VACCINE 7 YRS/> IM: CPT | Performed by: FAMILY MEDICINE

## 2022-06-08 PROCEDURE — 99396 PREV VISIT EST AGE 40-64: CPT | Mod: 25 | Performed by: FAMILY MEDICINE

## 2022-06-08 PROCEDURE — 90471 IMMUNIZATION ADMIN: CPT | Performed by: FAMILY MEDICINE

## 2022-06-08 RX ORDER — ZOLPIDEM TARTRATE 10 MG/1
5 TABLET ORAL
Qty: 15 TABLET | Refills: 0 | Status: SHIPPED | OUTPATIENT
Start: 2022-06-08 | End: 2022-10-06

## 2022-06-08 RX ORDER — FAMOTIDINE 20 MG/1
20 TABLET, FILM COATED ORAL
COMMUNITY

## 2022-06-08 RX ORDER — CHOLESTYRAMINE LIGHT 4 G/5.7G
POWDER, FOR SUSPENSION ORAL
Qty: 231 G | Refills: 3 | Status: SHIPPED | OUTPATIENT
Start: 2022-06-08 | End: 2024-08-12

## 2022-06-08 NOTE — NURSING NOTE
Filippo Saavedra is a 46 year old male patient that presents today in clinic for the following:    Chief Complaint   Patient presents with     Physical     Med review, physical     The patient's allergies and medications were reviewed as noted. A set of vitals were recorded as noted without incident. The patient does not have any other questions for the provider.    Benny Sandoval, EMT at 2:34 PM on 6/8/2022

## 2022-06-08 NOTE — PROGRESS NOTES
Assessment & Plan     High cholesterol  Recheck decide if change low dose zocor  - Lipid panel reflex to direct LDL Fasting; Future  - Comprehensive metabolic panel; Future    Health care maintenance  Due  - TDAP VACCINE (Adacel, Boostrix)  [3195209]  - Adult Gastro Ref - Procedure Only; Future    Irritable bowel syndrome with diarrhea    - cholestyramine light (PREVALITE) 4 GM powder; Use one scoop po qd  - hyoscyamine (LEVSIN/SL) 0.125 MG sublingual tablet; Take 1 tablet (0.125 mg) by mouth every 4 hours as needed for cramping    Other insomnia    - zolpidem (AMBIEN) 10 MG tablet; Take 0.5 tablets (5 mg) by mouth nightly as needed for sleep    Irritable bowel syndrome, unspecified type    - hyoscyamine (LEVSIN/SL) 0.125 MG sublingual tablet; Take 1 tablet (0.125 mg) by mouth every 4 hours as needed for cramping    Rash  Could be mild psoriasis  - Adult Dermatology Referral      43 minutes spent on the date of the encounter doing chart review, history and exam, documentation and further activities per the note    BMI:   Estimated body mass index is 25.74 kg/m  as calculated from the following:    Height as of this encounter: 1.829 m (6').    Weight as of this encounter: 86.1 kg (189 lb 12.8 oz).     Douglas Horton MD  [facility] IMANI Barkley is a 46 year old who presents for the following health issues     HPI   1-since 20s, IBS, mainly loose stools, triggered by po intake or stress, five/day loose, no blood melena, wt stable no other significant GI sx. Never did colonoscopy due anyway for age, unlikely significant findings given long term stable nature, unlikely collagenous colitis at his age of onset, never tried questran, discussed, failed bulk fiber trials in past, neg sprue labs  2-brother w/ psoriatic arthritis, he has a few scalp spots wonders if dandruff vs psoriasis, years ago pt had mono and typical psoriatic lesions on limbs then, o/w never.   No  relief otc dandruff shampoos.  Minimal itch, a few chronic small patches scalp  3-due for labs, on long term statin hi lipids  4-due for boostrix  5-Levsin might help IBS sx at times, keeps on hand  6-very sparing use ambien prn for insomnia, tolerated,  rvwd, helps/tolerated  No past medical history on file.  Past Surgical History:   Procedure Laterality Date     HERNIA REPAIR       Current Outpatient Medications   Medication     cholestyramine light (PREVALITE) 4 GM powder     famotidine (PEPCID) 20 MG tablet     hyoscyamine (LEVSIN/SL) 0.125 MG sublingual tablet     simvastatin (ZOCOR) 10 MG tablet     zolpidem (AMBIEN) 10 MG tablet     No current facility-administered medications for this visit.     No Known Allergies  Dad heart disease around age 60, mom ovary ca  Social History     Socioeconomic History     Marital status:      Spouse name: Not on file     Number of children: Not on file     Years of education: Not on file     Highest education level: Not on file   Occupational History     Not on file   Tobacco Use     Smoking status: Never Smoker     Smokeless tobacco: Never Used   Substance and Sexual Activity     Alcohol use: Yes     Comment: socially     Drug use: No     Sexual activity: Yes     Partners: Female   Other Topics Concern     Not on file   Social History Narrative     Not on file     Social Determinants of Health     Financial Resource Strain: Not on file   Food Insecurity: Not on file   Transportation Needs: Not on file   Physical Activity: Not on file   Stress: Not on file   Social Connections: Not on file   Intimate Partner Violence: Not on file   Housing Stability: Not on file      at , , one child    Review of Systems         Objective    /85 (BP Location: Right arm, Patient Position: Sitting, Cuff Size: Adult Regular)   Pulse 70   Ht 1.829 m (6')   Wt 86.1 kg (189 lb 12.8 oz)   SpO2 97%   BMI 25.74 kg/m    Body mass index is 25.74 kg/m .  Physical Exam    GENERAL: healthy, alert and no distress  EYES: Eyes grossly normal to inspection, PERRL and conjunctivae and sclerae normal  HENT: ear canals and TM's normal, nose and mouth without ulcers or lesions  NECK: no adenopathy, no asymmetry, masses, or scars and thyroid normal to palpation  RESP: lungs clear to auscultation - no rales, rhonchi or wheezes  CV: regular rate and rhythm, normal S1 S2, no S3 or S4, no murmur, click or rub, no peripheral edema and peripheral pulses strong  ABDOMEN: soft, nontender, no hepatosplenomegaly, no masses and bowel sounds normal   (male): normal male genitalia without lesions or urethral discharge, no hernia  MS: no gross musculoskeletal defects noted, no edema  SKIN: no suspicious lesions scalp several one cm red flaky patches hair intact  NEURO: Normal strength and tone, mentation intact and speech normal  PSYCH: mentation appears normal, affect normal/bright

## 2022-06-10 ENCOUNTER — LAB (OUTPATIENT)
Dept: LAB | Facility: CLINIC | Age: 47
End: 2022-06-10
Payer: COMMERCIAL

## 2022-06-10 ENCOUNTER — TELEPHONE (OUTPATIENT)
Dept: GASTROENTEROLOGY | Facility: CLINIC | Age: 47
End: 2022-06-10

## 2022-06-10 DIAGNOSIS — E78.00 HIGH CHOLESTEROL: ICD-10-CM

## 2022-06-10 LAB
ALBUMIN SERPL-MCNC: 4 G/DL (ref 3.4–5)
ALP SERPL-CCNC: 91 U/L (ref 40–150)
ALT SERPL W P-5'-P-CCNC: 46 U/L (ref 0–70)
ANION GAP SERPL CALCULATED.3IONS-SCNC: 6 MMOL/L (ref 3–14)
AST SERPL W P-5'-P-CCNC: 24 U/L (ref 0–45)
BILIRUB SERPL-MCNC: 0.4 MG/DL (ref 0.2–1.3)
BUN SERPL-MCNC: 13 MG/DL (ref 7–30)
CALCIUM SERPL-MCNC: 9.5 MG/DL (ref 8.5–10.1)
CHLORIDE BLD-SCNC: 107 MMOL/L (ref 94–109)
CHOLEST SERPL-MCNC: 161 MG/DL
CO2 SERPL-SCNC: 29 MMOL/L (ref 20–32)
CREAT SERPL-MCNC: 1.14 MG/DL (ref 0.66–1.25)
FASTING STATUS PATIENT QL REPORTED: YES
GFR SERPL CREATININE-BSD FRML MDRD: 79 ML/MIN/1.73M2
GLUCOSE BLD-MCNC: 101 MG/DL (ref 70–99)
HDLC SERPL-MCNC: 41 MG/DL
LDLC SERPL CALC-MCNC: 93 MG/DL
NONHDLC SERPL-MCNC: 120 MG/DL
POTASSIUM BLD-SCNC: 4.2 MMOL/L (ref 3.4–5.3)
PROT SERPL-MCNC: 7 G/DL (ref 6.8–8.8)
SODIUM SERPL-SCNC: 142 MMOL/L (ref 133–144)
TRIGL SERPL-MCNC: 134 MG/DL

## 2022-06-10 PROCEDURE — 80061 LIPID PANEL: CPT | Performed by: PATHOLOGY

## 2022-06-10 PROCEDURE — 36415 COLL VENOUS BLD VENIPUNCTURE: CPT | Performed by: PATHOLOGY

## 2022-06-10 PROCEDURE — 80053 COMPREHEN METABOLIC PANEL: CPT | Performed by: PATHOLOGY

## 2022-06-10 NOTE — TELEPHONE ENCOUNTER
Screening Questions  BlueKIND OF PREP RedLOCATION [review exclusion criteria] GreenSEDATION TYPE      1. Are you able to give consent for your medical care? Do you have a legal guardian or medical Power of ?  y (Sedation review/consideration needed)    2. Have you had a positive covid test in the last 90 days? n  a. If yes, what date?n    3. Are you active on mychart? y    4. What insurance is in the chart? [employer]     3.   Ordering/Referring Provider: Opal    4. BMI 25.7 [BMI OVER 40-EXTENDED PREP]  If greater than 40 review exclusion criteria [PAC APPT IF @ [facility]]        5.  Respiratory Screening :  [If yes to any of the following HOSPITAL setting only]     Do you use daily home oxygen? n    Do you have mod to severe Obstructive Sleep Apnea? n  [OKAY @ [facility] SH PH RI]   Do you have Pulmonary Hypertension? n     Do you have UNCONTROLLED asthma? n        6.   Have you had a heart or lung transplant? n      7.   Are you currently on dialysis? n [ If yes, G-PREP & HOSPITAL setting only]     8.   Do you have chronic kidney disease? n [ If yes, G-PREP ]    9.   Have you had a stroke or Transient ischemic attack (TIA - aka  mini stroke ) within 6 months?  n (If yes, please review exclusion criteria)    10.   In the past 6 months, have you had any heart related issues including cardiomyopathy or heart attack? n           If yes, did it require cardiac stenting or other implantable device? n      11.   Do you have any implantable devices in your body (pacemaker, defib, LVAD)? n (If yes, please review exclusion criteria)    12.   Do you take nitroglycerin? n           If yes, how often? n  (if yes, HOSPITAL setting ONLY)    13.   Are you currently taking any blood thinners? n           [IF YES, INFORM PATIENT TO FOLLOW UP W/ ORDERING PROVIDER FOR BRIDGING INSTRUCTIONS]     14.   Do you have a diagnosis of diabetes? n   [ If yes, G-PREP ]    15.   [FEMALES] Are you currently pregnant?     If yes, how many  weeks?     16.   Are you taking any prescription pain medications on a routine schedule?  n  [ If yes, EXTENDED PREP.] [If yes, MAC]    17.   Do you have any chemical dependencies such as alcohol, street drugs, or methadone?  n [If yes, MAC]    18.   Do you have any history of post-traumatic stress syndrome, severe anxiety or history of psychosis?  n  [If yes, MAC]    19.   Do you transfer independently?  y    20.  On a regular basis do you go 3-5 days between bowel movements? n   [ If yes, EXTENDED PREP.]    21.   Preferred LOCAL Pharmacy for Pre Prescription   [id number] [address]  AT [facility] [address]      Scheduling Details      Caller : Filippo Saavedra  (Please ask for phone number if not scheduled by patient)    Type of Procedure Scheduled: Colon  Which Colonoscopy Prep was Sent?: Mprep  BRUCE CF PATIENTS & GROEN'S PATIENTS REQUIRE EXTENDED PREP  Surgeon:Janny  Date of Procedure: 8/30  [place] [facility] [place] Type: CS  Conscious Sedation- Needs  for 6 hours after the procedure  MAC/General-Needs  for 24 hours after procedure    Pre-op Required at [facility] [place], [place] and OR for MAC sedation: n  (advise patient they will need a pre-op prior to procedure -)      Informed patient they will need an adult  y  Cannot take any type of public or medical transportation alone    Pre-Procedure Covid test to be completed [facility] or Externally: y    Confirmed Nurse will call to complete assessment y    Additional comments:

## 2022-08-16 DIAGNOSIS — E78.00 HIGH CHOLESTEROL: ICD-10-CM

## 2022-08-19 ENCOUNTER — TELEPHONE (OUTPATIENT)
Dept: GASTROENTEROLOGY | Facility: CLINIC | Age: 47
End: 2022-08-19

## 2022-08-19 DIAGNOSIS — Z12.11 ENCOUNTER FOR SCREENING COLONOSCOPY: Primary | ICD-10-CM

## 2022-08-19 RX ORDER — SIMVASTATIN 10 MG
5 TABLET ORAL AT BEDTIME
Qty: 45 TABLET | Refills: 3 | Status: SHIPPED | OUTPATIENT
Start: 2022-08-19 | End: 2024-03-01

## 2022-08-19 RX ORDER — BISACODYL 5 MG/1
TABLET, DELAYED RELEASE ORAL
Qty: 4 TABLET | Refills: 0 | Status: SHIPPED | OUTPATIENT
Start: 2022-08-19

## 2022-08-19 NOTE — TELEPHONE ENCOUNTER
Pre assessment questions completed for upcoming Colonoscopy procedure scheduled on 8/30/22    COVID policy reviewed. Patient to complete rapid antigen test one to two days before their scheduled procedure. Patient to bring photo of the results when they come in for their procedure.    Reviewed procedural arrival time 0800 and facility location [facility] [place].    Designated  policy reviewed. Instructed to have someone stay 6 hours post procedure.     Reviewed  prep instructions with patient. No fiber/iron supplements or foods that contain nuts/seeds 7 days prior to procedure.     Anticoagulation/blood thinners? None    Electronic implanted devices? None    Patient verbalized understanding and had no questions or concerns at this time.    Ale So RN

## 2022-08-19 NOTE — TELEPHONE ENCOUNTER
Patient scheduled for Colonoscopy on 8/30/22.     Discuss at home test option.     Arrival time: 0800    [place] [facility] [place]edation type: CS    Indication for procedure: Screening    Anticoagulations? None     Bowel prep recommendation: GoLytely    GoLytely prep sent to [facility] #[id number] - [place], [place] - [address] [place] [address] AT [place] [address] pharmacy. Prep instructions sent via [email or web address]    Pre visit planning completed.    Ale So RN

## 2022-08-29 ENCOUNTER — ANESTHESIA EVENT (OUTPATIENT)
Dept: SURGERY | Facility: AMBULATORY SURGERY CENTER | Age: 47
End: 2022-08-29
Payer: COMMERCIAL

## 2022-08-29 NOTE — ANESTHESIA PREPROCEDURE EVALUATION
Anesthesia Pre-Procedure Evaluation    Patient: Filippo Saavedra   MRN: [id number] : 1975        Procedure : Procedure(s):  COLONOSCOPY          No past medical history on file.   Past Surgical History:   Procedure Laterality Date     HERNIA REPAIR        No Known Allergies   Social History     Tobacco Use     Smoking status: Never Smoker     Smokeless tobacco: Never Used   Substance Use Topics     Alcohol use: Yes     Comment: socially      Wt Readings from Last 1 Encounters:   22 86.1 kg (189 lb 12.8 oz)           Physical Exam    Airway        Mallampati: I   TM distance: > 3 FB   Neck ROM: full   Mouth opening: > 3 cm    Respiratory Devices and Support         Dental  no notable dental history         Cardiovascular   cardiovascular exam normal          Pulmonary   pulmonary exam normal                OUTSIDE LABS:  CBC:   Lab Results   Component Value Date    WBC 6.1 2017    WBC 7.7 2012    HGB 15.6 2017    HGB 17.3 2012    HCT 47.4 2017    HCT 50.5 2012     2017     2012     BMP:   Lab Results   Component Value Date     06/10/2022     2017    POTASSIUM 4.2 06/10/2022    POTASSIUM 4.0 2017    CHLORIDE 107 06/10/2022    CHLORIDE 105 2017    CO2 29 06/10/2022    CO2 30 2017    BUN 13 06/10/2022    BUN 12 2017    CR 1.14 06/10/2022    CR 1.05 2017     (H) 06/10/2022    GLC 95 2017     COAGS: No results found for: PTT, INR, FIBR  POC: No results found for: BGM, HCG, HCGS  HEPATIC:   Lab Results   Component Value Date    ALBUMIN 4.0 06/10/2022    PROTTOTAL 7.0 06/10/2022    ALT 46 06/10/2022    AST 24 06/10/2022    ALKPHOS 91 06/10/2022    BILITOTAL 0.4 06/10/2022     OTHER:   Lab Results   Component Value Date    RODRIGO 9.5 06/10/2022       Anesthesia Plan    ASA Status:  2   NPO Status:  NPO Appropriate    Anesthesia Type: MAC.     - Reason for MAC: straight local not clinically  adequate   Induction: Intravenous, Propofol.   Maintenance: TIVA.        Consents    Anesthesia Plan(s) and associated risks, benefits, and realistic alternatives discussed. Questions answered and patient/representative(s) expressed understanding.    - Discussed:     - Discussed with:  Patient      - Extended Intubation/Ventilatory Support Discussed: No.      - Patient is DNR/DNI Status: No    Use of blood products discussed: No .     Postoperative Care       PONV prophylaxis: Ondansetron (or other 5HT-3), Background Propofol Infusion     Comments:           H&P reviewed: Unable to attach H&P to encounter due to EHR limitations. H&P Update: appropriate H&P reviewed, patient examined. No interval changes since H&P (within 30 days).         Navin Clark MD

## 2022-08-30 ENCOUNTER — ANESTHESIA (OUTPATIENT)
Dept: SURGERY | Facility: AMBULATORY SURGERY CENTER | Age: 47
End: 2022-08-30
Payer: COMMERCIAL

## 2022-08-30 ENCOUNTER — HOSPITAL ENCOUNTER (OUTPATIENT)
Facility: AMBULATORY SURGERY CENTER | Age: 47
Discharge: HOME | End: 2022-08-30
Attending: INTERNAL MEDICINE
Payer: COMMERCIAL

## 2022-08-30 VITALS
WEIGHT: 183 LBS | RESPIRATION RATE: 16 BRPM | BODY MASS INDEX: 24.79 KG/M2 | HEART RATE: 68 BPM | DIASTOLIC BLOOD PRESSURE: 70 MMHG | TEMPERATURE: 96.5 F | SYSTOLIC BLOOD PRESSURE: 111 MMHG | OXYGEN SATURATION: 99 % | HEIGHT: 72 IN

## 2022-08-30 VITALS — HEART RATE: 74 BPM

## 2022-08-30 LAB — COLONOSCOPY: NORMAL

## 2022-08-30 PROCEDURE — 88305 TISSUE EXAM BY PATHOLOGIST: CPT | Mod: 26 | Performed by: PATHOLOGY

## 2022-08-30 PROCEDURE — 88305 TISSUE EXAM BY PATHOLOGIST: CPT | Mod: TC | Performed by: INTERNAL MEDICINE

## 2022-08-30 PROCEDURE — 45380 COLONOSCOPY AND BIOPSY: CPT | Mod: 33

## 2022-08-30 RX ORDER — PROCHLORPERAZINE MALEATE 10 MG
10 TABLET ORAL EVERY 6 HOURS PRN
Status: CANCELLED | OUTPATIENT
Start: 2022-08-30

## 2022-08-30 RX ORDER — FLUMAZENIL 0.1 MG/ML
0.2 INJECTION, SOLUTION INTRAVENOUS
Status: CANCELLED | OUTPATIENT
Start: 2022-08-30 | End: 2022-08-30

## 2022-08-30 RX ORDER — NALOXONE HYDROCHLORIDE 0.4 MG/ML
0.4 INJECTION, SOLUTION INTRAMUSCULAR; INTRAVENOUS; SUBCUTANEOUS
Status: CANCELLED | OUTPATIENT
Start: 2022-08-30

## 2022-08-30 RX ORDER — NALOXONE HYDROCHLORIDE 0.4 MG/ML
0.2 INJECTION, SOLUTION INTRAMUSCULAR; INTRAVENOUS; SUBCUTANEOUS
Status: CANCELLED | OUTPATIENT
Start: 2022-08-30

## 2022-08-30 RX ORDER — PROPOFOL 10 MG/ML
INJECTION, EMULSION INTRAVENOUS CONTINUOUS PRN
Status: DISCONTINUED | OUTPATIENT
Start: 2022-08-30 | End: 2022-08-30

## 2022-08-30 RX ORDER — LIDOCAINE HYDROCHLORIDE 20 MG/ML
INJECTION, SOLUTION INFILTRATION; PERINEURAL PRN
Status: DISCONTINUED | OUTPATIENT
Start: 2022-08-30 | End: 2022-08-30

## 2022-08-30 RX ORDER — ONDANSETRON 2 MG/ML
4 INJECTION INTRAMUSCULAR; INTRAVENOUS
Status: DISCONTINUED | OUTPATIENT
Start: 2022-08-30 | End: 2022-08-31 | Stop reason: HOSPADM

## 2022-08-30 RX ORDER — ONDANSETRON 2 MG/ML
4 INJECTION INTRAMUSCULAR; INTRAVENOUS EVERY 6 HOURS PRN
Status: CANCELLED | OUTPATIENT
Start: 2022-08-30

## 2022-08-30 RX ORDER — LIDOCAINE 40 MG/G
CREAM TOPICAL
Status: DISCONTINUED | OUTPATIENT
Start: 2022-08-30 | End: 2022-08-31 | Stop reason: HOSPADM

## 2022-08-30 RX ORDER — PROPOFOL 10 MG/ML
INJECTION, EMULSION INTRAVENOUS PRN
Status: DISCONTINUED | OUTPATIENT
Start: 2022-08-30 | End: 2022-08-30

## 2022-08-30 RX ORDER — SODIUM CHLORIDE, SODIUM LACTATE, POTASSIUM CHLORIDE, CALCIUM CHLORIDE 600; 310; 30; 20 MG/100ML; MG/100ML; MG/100ML; MG/100ML
INJECTION, SOLUTION INTRAVENOUS CONTINUOUS
Status: DISCONTINUED | OUTPATIENT
Start: 2022-08-30 | End: 2022-08-31 | Stop reason: HOSPADM

## 2022-08-30 RX ORDER — ONDANSETRON 4 MG/1
4 TABLET, ORALLY DISINTEGRATING ORAL EVERY 6 HOURS PRN
Status: CANCELLED | OUTPATIENT
Start: 2022-08-30

## 2022-08-30 RX ORDER — DIPHENHYDRAMINE HCL 25 MG
25 TABLET ORAL
COMMUNITY

## 2022-08-30 RX ADMIN — PROPOFOL 75 MCG/KG/MIN: 10 INJECTION, EMULSION INTRAVENOUS at 09:46

## 2022-08-30 RX ADMIN — PROPOFOL 40 MG: 10 INJECTION, EMULSION INTRAVENOUS at 09:27

## 2022-08-30 RX ADMIN — SODIUM CHLORIDE, SODIUM LACTATE, POTASSIUM CHLORIDE, CALCIUM CHLORIDE: 600; 310; 30; 20 INJECTION, SOLUTION INTRAVENOUS at 08:41

## 2022-08-30 RX ADMIN — PROPOFOL 60 MG: 10 INJECTION, EMULSION INTRAVENOUS at 09:19

## 2022-08-30 RX ADMIN — LIDOCAINE HYDROCHLORIDE 100 MG: 20 INJECTION, SOLUTION INFILTRATION; PERINEURAL at 09:17

## 2022-08-30 RX ADMIN — PROPOFOL 150 MCG/KG/MIN: 10 INJECTION, EMULSION INTRAVENOUS at 09:17

## 2022-08-30 NOTE — ANESTHESIA CARE TRANSFER NOTE
Patient: Filippo Saavedra    Procedure: Procedure(s):  COLONOSCOPY, WITH POLYPECTOMY AND BIOPSY       Diagnosis: Health care maintenance [Z00.00]  Diagnosis Additional Information: No value filed.    Anesthesia Type:   MAC     Note:    Oropharynx: spontaneously breathing  Level of Consciousness: awake  Oxygen Supplementation: room air    Independent Airway: airway patency satisfactory and stable  Dentition: dentition unchanged  Vital Signs Stable: post-procedure vital signs reviewed and stable  Report to RN Given: handoff report given  Patient transferred to: Phase II    Handoff Report: Identifed the Patient, Identified the Reponsible Provider, Reviewed the pertinent medical history, Discussed the surgical course, Reviewed Intra-OP anesthesia mangement and issues during anesthesia, Set expectations for post-procedure period and Allowed opportunity for questions and acknowledgement of understanding      Vitals:  Vitals Value Taken Time   /70 08/30/22 1012   Temp 35.8  C (96.5  F) 08/30/22 1012   Pulse 68 08/30/22 1012   Resp 16 08/30/22 1012   SpO2 99 % 08/30/22 1012       Electronically Signed By: CAT Williamson CRNA  August 30, 2022  10:14 AM

## 2022-08-30 NOTE — ANESTHESIA POSTPROCEDURE EVALUATION
Patient: Filippo Saavedra    Procedure: Procedure(s):  COLONOSCOPY, WITH POLYPECTOMY AND BIOPSY       Anesthesia Type:  MAC    Note:  Disposition: Outpatient   Postop Pain Control: Uneventful            Sign Out: Well controlled pain   PONV:    Neuro/Psych: Uneventful            Sign Out: Acceptable/Baseline neuro status   Airway/Respiratory: Uneventful            Sign Out: Acceptable/Baseline resp. status   CV/Hemodynamics: Uneventful            Sign Out: Acceptable CV status; No obvious hypovolemia; No obvious fluid overload   Other NRE:    DID A NON-ROUTINE EVENT OCCUR?            Last vitals:  Vitals Value Taken Time   /70 08/30/22 1012   Temp 35.8  C (96.5  F) 08/30/22 1012   Pulse 68 08/30/22 1012   Resp 16 08/30/22 1012   SpO2 99 % 08/30/22 1012       Electronically Signed By: Navin Clark MD  August 30, 2022  12:33 PM

## 2022-08-30 NOTE — H&P
Filippo Sierran [facility]  [id number]  male  47 year old      Reason for procedure/surgery: Screening for colorectal cancer    There is no problem list on file for this patient.      Past Surgical History:    Past Surgical History:   Procedure Laterality Date     HERNIA REPAIR         Past Medical History: No past medical history on file.    Social History:   Social History     Tobacco Use     Smoking status: Never Smoker     Smokeless tobacco: Never Used   Substance Use Topics     Alcohol use: Yes     Comment: socially       Family History: History reviewed. No pertinent family history.    Allergies: No Known Allergies    Active Medications:   Current Outpatient Medications   Medication Sig Dispense Refill     bisacodyl (DULCOLAX) 5 MG EC tablet Take as directed. One day before exam take 2 tablets at 3 PM. Take 2 tablets at 11 PM. 4 tablet 0     diphenhydrAMINE (BENADRYL) 25 MG tablet Take 25 mg by mouth nightly as needed for allergies, sleep or itching       famotidine (PEPCID) 20 MG tablet Take 20 mg by mouth nightly as needed       hyoscyamine (LEVSIN/SL) 0.125 MG sublingual tablet Take 1 tablet (0.125 mg) by mouth every 4 hours as needed for cramping 30 tablet 0     polyethylene glycol (GOLYTELY) 236 g suspension Take as directed. One day before exam fill the jug with water. Cover and shake until well mixed. At 6 PM start drinking an 8oz glass of mixture every 15 minutes until jug is 1/2 empty. Store remainder in the refrigerator.  At 11 PM Start drinking the other half of the Golytely jug. Drink one 8-ounce glass every 15 minutes until the jug is empty. 4000 mL 0     simvastatin (ZOCOR) 10 MG tablet Take 0.5 tablets (5 mg) by mouth At Bedtime 45 tablet 3     zolpidem (AMBIEN) 10 MG tablet Take 0.5 tablets (5 mg) by mouth nightly as needed for sleep 15 tablet 0     cholestyramine light (PREVALITE) 4 GM powder Use one scoop po qd 231 g 3       Systemic Review:   CONSTITUTIONAL: NEGATIVE for fever, chills, change in  weight  ENT/MOUTH: NEGATIVE for ear, mouth and throat problems  RESP: NEGATIVE for significant cough or SOB  CV: NEGATIVE for chest pain, palpitations or peripheral edema    Physical Examination:   Vital Signs: /83 (BP Location: Right arm)   Pulse 81   Temp 97  F (36.1  C) (Temporal)   Resp 18   Ht 1.829 m (6')   Wt 83 kg (183 lb)   SpO2 96%   BMI 24.82 kg/m    GENERAL: healthy, alert and no distress  NECK: no asymmetry  RESP: lungs clear to auscultation - no rales, rhonchi or wheezes  CV: regular rate and rhythm, normal S1 S2, no peripheral edema and peripheral pulses strong  ABDOMEN: soft, nontender  MS: no gross musculoskeletal defects noted, no edema    Plan: Appropriate to proceed as scheduled.      Jorje Puentes MD  8/30/2022    PCP:  Douglas Horton

## 2022-09-20 LAB
PATH REPORT.COMMENTS IMP SPEC: NORMAL
PATH REPORT.COMMENTS IMP SPEC: NORMAL
PATH REPORT.FINAL DX SPEC: NORMAL
PATH REPORT.GROSS SPEC: NORMAL
PATH REPORT.MICROSCOPIC SPEC OTHER STN: NORMAL
PATH REPORT.RELEVANT HX SPEC: NORMAL
PHOTO IMAGE: NORMAL

## 2022-10-03 ENCOUNTER — MYC REFILL (OUTPATIENT)
Dept: FAMILY MEDICINE | Facility: CLINIC | Age: 47
End: 2022-10-03

## 2022-10-03 DIAGNOSIS — G47.09 OTHER INSOMNIA: ICD-10-CM

## 2022-10-03 RX ORDER — ZOLPIDEM TARTRATE 10 MG/1
5 TABLET ORAL
Qty: 15 TABLET | Refills: 0 | Status: CANCELLED | OUTPATIENT
Start: 2022-10-03

## 2022-10-04 ENCOUNTER — OFFICE VISIT (OUTPATIENT)
Dept: DERMATOLOGY | Facility: CLINIC | Age: 47
End: 2022-10-04
Attending: FAMILY MEDICINE
Payer: COMMERCIAL

## 2022-10-04 DIAGNOSIS — D22.9 MULTIPLE BENIGN NEVI: ICD-10-CM

## 2022-10-04 DIAGNOSIS — L82.0 INFLAMED SEBORRHEIC KERATOSIS: ICD-10-CM

## 2022-10-04 DIAGNOSIS — L21.9 DERMATITIS, SEBORRHEIC: Primary | ICD-10-CM

## 2022-10-04 DIAGNOSIS — L81.4 LENTIGINES: ICD-10-CM

## 2022-10-04 PROCEDURE — 99204 OFFICE O/P NEW MOD 45 MIN: CPT | Mod: 25 | Performed by: STUDENT IN AN ORGANIZED HEALTH CARE EDUCATION/TRAINING PROGRAM

## 2022-10-04 PROCEDURE — 17110 DESTRUCTION B9 LES UP TO 14: CPT | Performed by: STUDENT IN AN ORGANIZED HEALTH CARE EDUCATION/TRAINING PROGRAM

## 2022-10-04 RX ORDER — KETOCONAZOLE 20 MG/ML
SHAMPOO TOPICAL DAILY PRN
Qty: 120 ML | Refills: 3 | Status: SHIPPED | OUTPATIENT
Start: 2022-10-04

## 2022-10-04 NOTE — PROGRESS NOTES
[facility] Dermatology Note    Encounter Date: Oct 4, 2022    Dermatology Problem List:    ______________________________________    Impression/Plan:  Filippo was seen today for derm problem.    Diagnoses and all orders for this visit:    Dermatitis, seborrheic  -     ketoconazole (NIZORAL) 2 % external shampoo; Apply topically daily as needed for itching or irritation  - uses head and shoulders 1-3x week   -Can add topical steroid later if this regimen is insufficient    Multiple benign nevi  - benign    Lentigines  - benign    Inflamed seborrheic keratosis  -     DESTRUCT BENIGN LESION, UP TO 14  - ln2 x6 on neck      Cryotherapy procedure note: After verbal consent and discussion of risks and benefits including but no limited to dyspigmentation/scar, blister, and pain, 6 isks on neck was(were) treated with 1-2mm freeze border for 2 cycles with liquid nitrogen. Post cryotherapy instructions were provided.       Follow-up in 1 year .       Staff Involved:  Staff Only    Krishan Couch MD   of Dermatology  Department of Dermatology  [facility]      CC:   Chief Complaint   Patient presents with     Derm Problem     Filippo is here today for a full body skin check. Has 2 concerning lesions one on R. Arm and one on back. Denies symptoms. Also wondering about dandruff treatment.       History of Present Illness:  Mr. Filippo Saavedra is a 47 year old male who presents as a new patient.    Chronic itching and flaking of scalp uses head and shoulders infrequently 1-3 times per week unsure if this helps his scalp, has never used a topical steroid or ketoconazole shampoo    Labs:      Physical exam:  Vitals: There were no vitals taken for this visit.  GEN: well developed, well-nourished, in no acute distress, in a pleasant mood.     SKIN: Munoz phototype 2  - Full skin, which includes the head/face, both arms, chest, back, abdomen,both legs, genitalia  and/or groin buttocks, digits and/or nails, was examined.  - Flat brown macules and patches in a sun exposed areas on face and extremities  - scattered brown papules on trunk and extremities   - Stuck on brown papules on trunk and extremities   - greasy scaling of scalp and NLF  - No other lesions of concern on areas examined.     Past Medical History:   No past medical history on file.  Past Surgical History:   Procedure Laterality Date     COLONOSCOPY N/A 8/30/2022    Procedure: COLONOSCOPY, WITH POLYPECTOMY AND BIOPSY;  Surgeon: Kusmu Bae MD;  Location: [facility] OR     HERNIA REPAIR         Social History:   reports that he has never smoked. He has never used smokeless tobacco. He reports current alcohol use. He reports that he does not use drugs.    Family History:  No family history on file.    Medications:  Current Outpatient Medications   Medication Sig Dispense Refill     bisacodyl (DULCOLAX) 5 MG EC tablet Take as directed. One day before exam take 2 tablets at 3 PM. Take 2 tablets at 11 PM. 4 tablet 0     cholestyramine light (PREVALITE) 4 GM powder Use one scoop po qd 231 g 3     diphenhydrAMINE (BENADRYL) 25 MG tablet Take 25 mg by mouth nightly as needed for allergies, sleep or itching       famotidine (PEPCID) 20 MG tablet Take 20 mg by mouth nightly as needed       hyoscyamine (LEVSIN/SL) 0.125 MG sublingual tablet Take 1 tablet (0.125 mg) by mouth every 4 hours as needed for cramping 30 tablet 0     ketoconazole (NIZORAL) 2 % external shampoo Apply topically daily as needed for itching or irritation 120 mL 3     polyethylene glycol (GOLYTELY) 236 g suspension Take as directed. One day before exam fill the jug with water. Cover and shake until well mixed. At 6 PM start drinking an 8oz glass of mixture every 15 minutes until jug is 1/2 empty. Store remainder in the refrigerator.  At 11 PM Start drinking the other half of the Golytely jug. Drink one 8-ounce glass every 15 minutes until the jug is  empty. 4000 mL 0     simvastatin (ZOCOR) 10 MG tablet Take 0.5 tablets (5 mg) by mouth At Bedtime 45 tablet 3     zolpidem (AMBIEN) 10 MG tablet Take 0.5 tablets (5 mg) by mouth nightly as needed for sleep 15 tablet 0     No Known Allergies

## 2022-10-04 NOTE — NURSING NOTE
Dermatology Rooming Note    Filippo Saavedra's goals for this visit include:   Chief Complaint   Patient presents with     Derm Problem     Filippo is here today for a full body skin check. Has 2 concerning lesions one on R. Arm and one on back. Denies symptoms. Also wondering about dandruff treatment.

## 2022-10-04 NOTE — PATIENT INSTRUCTIONS
Start ketoconazole shampoo 3 times weekly. Massage into wet scalp, let sit 3-5 min, then rinse.    CRYOTHERAPY    What is it?  Use of a very cold liquid, such as liquid nitrogen, to freeze and destroy abnormal skin cells that need to be removed    What should I expect?  Tenderness and redness  A small blister that might grow and fill with dark purple blood.  More than one treatment may be needed if the lesions do not go away.    How do I care for the treated area?  Gently wash the area with your hands when bathing.  Use a thin layer of VaselineÆ to help with healing.   The area should heal within 7-10 days.  Do not use an antibiotic or NeosporinÆ ointment.   You may take acetaminophen (TylenolÆ) for pain.     Call your Doctor if you have:  Severe pain  Signs of infection (warmth, redness, cloudy yellow drainage, and or a bad smell)  Questions or concerns

## 2022-10-04 NOTE — LETTER
10/4/2022       RE: Filippo Saavedra  [address]  [facility] [address]     Dear Colleague,    Thank you for referring your patient, Filippo Saavedra, to the [facility] [place] at [facility]. Please see a copy of my visit note below.    [facility] Dermatology Note    Encounter Date: Oct 4, 2022    Dermatology Problem List:    ______________________________________    Impression/Plan:  Filippo was seen today for derm problem.    Diagnoses and all orders for this visit:    Dermatitis, seborrheic  -     ketoconazole (NIZORAL) 2 % external shampoo; Apply topically daily as needed for itching or irritation  - uses head and shoulders 1-3x week   -Can add topical steroid later if this regimen is insufficient    Multiple benign nevi  - benign    Lentigines  - benign    Inflamed seborrheic keratosis  -     DESTRUCT BENIGN LESION, UP TO 14  - ln2 x6 on neck      Cryotherapy procedure note: After verbal consent and discussion of risks and benefits including but no limited to dyspigmentation/scar, blister, and pain, 6 isks on neck was(were) treated with 1-2mm freeze border for 2 cycles with liquid nitrogen. Post cryotherapy instructions were provided.       Follow-up in 1 year .       Staff Involved:  Staff Only    Krishan Couch MD   of Dermatology  Department of Dermatology  [facility]      CC:   Chief Complaint   Patient presents with     Derm Problem     Filippo is here today for a full body skin check. Has 2 concerning lesions one on R. Arm and one on back. Denies symptoms. Also wondering about dandruff treatment.       History of Present Illness:  Mr. Filippo Saavedra is a 47 year old male who presents as a new patient.    Chronic itching and flaking of scalp uses head and shoulders infrequently 1-3 times per week unsure if this helps his scalp, has never used a topical steroid or  ketoconazole shampoo    Labs:      Physical exam:  Vitals: There were no vitals taken for this visit.  GEN: well developed, well-nourished, in no acute distress, in a pleasant mood.     SKIN: Munoz phototype 2  - Full skin, which includes the head/face, both arms, chest, back, abdomen,both legs, genitalia and/or groin buttocks, digits and/or nails, was examined.  - Flat brown macules and patches in a sun exposed areas on face and extremities  - scattered brown papules on trunk and extremities   - Stuck on brown papules on trunk and extremities   - greasy scaling of scalp and NLF  - No other lesions of concern on areas examined.     Past Medical History:   No past medical history on file.  Past Surgical History:   Procedure Laterality Date     COLONOSCOPY N/A 8/30/2022    Procedure: COLONOSCOPY, WITH POLYPECTOMY AND BIOPSY;  Surgeon: Kusum Bae MD;  Location: [facility] OR     HERNIA REPAIR         Social History:   reports that he has never smoked. He has never used smokeless tobacco. He reports current alcohol use. He reports that he does not use drugs.    Family History:  No family history on file.    Medications:  Current Outpatient Medications   Medication Sig Dispense Refill     bisacodyl (DULCOLAX) 5 MG EC tablet Take as directed. One day before exam take 2 tablets at 3 PM. Take 2 tablets at 11 PM. 4 tablet 0     cholestyramine light (PREVALITE) 4 GM powder Use one scoop po qd 231 g 3     diphenhydrAMINE (BENADRYL) 25 MG tablet Take 25 mg by mouth nightly as needed for allergies, sleep or itching       famotidine (PEPCID) 20 MG tablet Take 20 mg by mouth nightly as needed       hyoscyamine (LEVSIN/SL) 0.125 MG sublingual tablet Take 1 tablet (0.125 mg) by mouth every 4 hours as needed for cramping 30 tablet 0     ketoconazole (NIZORAL) 2 % external shampoo Apply topically daily as needed for itching or irritation 120 mL 3     polyethylene glycol (GOLYTELY) 236 g suspension Take as directed. One day  before exam fill the jug with water. Cover and shake until well mixed. At 6 PM start drinking an 8oz glass of mixture every 15 minutes until jug is 1/2 empty. Store remainder in the refrigerator.  At 11 PM Start drinking the other half of the Golytely jug. Drink one 8-ounce glass every 15 minutes until the jug is empty. 4000 mL 0     simvastatin (ZOCOR) 10 MG tablet Take 0.5 tablets (5 mg) by mouth At Bedtime 45 tablet 3     zolpidem (AMBIEN) 10 MG tablet Take 0.5 tablets (5 mg) by mouth nightly as needed for sleep 15 tablet 0     No Known Allergies

## 2022-10-06 DIAGNOSIS — G47.09 OTHER INSOMNIA: ICD-10-CM

## 2022-10-06 NOTE — TELEPHONE ENCOUNTER
ZOLPIDEM 10MG TABLETS  Last Written Prescription Date:   6/8/2022  Last Fill Quantity: 15,   # refills: 0  Last Office Visit :  6/8/2022  Future Office visit:  None    Routing refill request to provider for review/approval because:  Drug not on the [facility], [facility] or [facility] refill protocol or controlled substance      Griselda Beaver RN  Central Triage Red Flags/Med Refills

## 2022-10-07 RX ORDER — ZOLPIDEM TARTRATE 10 MG/1
5 TABLET ORAL
Qty: 30 TABLET | Refills: 3 | Status: SHIPPED | OUTPATIENT
Start: 2022-10-07 | End: 2024-07-30

## 2022-11-17 DIAGNOSIS — J10.1 INFLUENZA A: Primary | ICD-10-CM

## 2022-11-17 RX ORDER — OSELTAMIVIR PHOSPHATE 75 MG/1
75 CAPSULE ORAL DAILY
Qty: 7 CAPSULE | Refills: 0 | Status: SHIPPED | OUTPATIENT
Start: 2022-11-17 | End: 2022-11-24

## 2023-01-14 ENCOUNTER — HEALTH MAINTENANCE LETTER (OUTPATIENT)
Age: 48
End: 2023-01-14

## 2023-07-22 ENCOUNTER — HEALTH MAINTENANCE LETTER (OUTPATIENT)
Age: 48
End: 2023-07-22

## 2024-03-01 DIAGNOSIS — E78.00 HIGH CHOLESTEROL: ICD-10-CM

## 2024-03-01 RX ORDER — SIMVASTATIN 10 MG
5 TABLET ORAL AT BEDTIME
Qty: 15 TABLET | Refills: 0 | Status: SHIPPED | OUTPATIENT
Start: 2024-03-01 | End: 2024-07-30

## 2024-03-01 NOTE — TELEPHONE ENCOUNTER
simvastatin (ZOCOR) 10 MG tablet   Last Written Prescription Date:  8/19/2022  Last Fill Quantity: 45,   # refills: 3  Last Office Visit : 6/8/2022  Future Office visit:  None    Routing refill request to provider for review/approval because:  Way over due office visit and labs.   Last seen Aug 2022.  Pt needs updated office visit and labs.  Please call Pt to schedule  Thank you     Griselda Beaver RN  Central Triage Red Flags/Med Refills

## 2024-07-30 ENCOUNTER — LAB (OUTPATIENT)
Dept: LAB | Facility: CLINIC | Age: 49
End: 2024-07-30
Payer: COMMERCIAL

## 2024-07-30 ENCOUNTER — OFFICE VISIT (OUTPATIENT)
Dept: INTERNAL MEDICINE | Facility: CLINIC | Age: 49
End: 2024-07-30
Payer: COMMERCIAL

## 2024-07-30 ENCOUNTER — ANCILLARY PROCEDURE (OUTPATIENT)
Dept: GENERAL RADIOLOGY | Facility: CLINIC | Age: 49
End: 2024-07-30
Attending: NURSE PRACTITIONER
Payer: COMMERCIAL

## 2024-07-30 VITALS
SYSTOLIC BLOOD PRESSURE: 130 MMHG | BODY MASS INDEX: 25.41 KG/M2 | WEIGHT: 187.6 LBS | DIASTOLIC BLOOD PRESSURE: 87 MMHG | HEIGHT: 72 IN | OXYGEN SATURATION: 94 % | HEART RATE: 71 BPM

## 2024-07-30 DIAGNOSIS — K58.0 IRRITABLE BOWEL SYNDROME WITH DIARRHEA: ICD-10-CM

## 2024-07-30 DIAGNOSIS — T56.0X1A ACUTE LEAD-INDUCED GOUT INVOLVING TOE, UNSPECIFIED LATERALITY, INITIAL ENCOUNTER: Primary | ICD-10-CM

## 2024-07-30 DIAGNOSIS — K58.9 IRRITABLE BOWEL SYNDROME, UNSPECIFIED TYPE: ICD-10-CM

## 2024-07-30 DIAGNOSIS — M79.671 PAIN IN RIGHT FOOT: ICD-10-CM

## 2024-07-30 DIAGNOSIS — M79.674 PAIN OF TOE OF RIGHT FOOT: Primary | ICD-10-CM

## 2024-07-30 DIAGNOSIS — M10.179 ACUTE LEAD-INDUCED GOUT INVOLVING TOE, UNSPECIFIED LATERALITY, INITIAL ENCOUNTER: Primary | ICD-10-CM

## 2024-07-30 DIAGNOSIS — E78.00 HIGH CHOLESTEROL: ICD-10-CM

## 2024-07-30 DIAGNOSIS — G47.09 OTHER INSOMNIA: ICD-10-CM

## 2024-07-30 LAB
ERYTHROCYTE [SEDIMENTATION RATE] IN BLOOD BY WESTERGREN METHOD: 7 MM/HR (ref 0–20)
URATE SERPL-MCNC: 7.9 MG/DL (ref 3.4–7)

## 2024-07-30 PROCEDURE — 99214 OFFICE O/P EST MOD 30 MIN: CPT | Performed by: NURSE PRACTITIONER

## 2024-07-30 PROCEDURE — 85652 RBC SED RATE AUTOMATED: CPT | Performed by: PATHOLOGY

## 2024-07-30 PROCEDURE — 73630 X-RAY EXAM OF FOOT: CPT | Mod: RT | Performed by: RADIOLOGY

## 2024-07-30 PROCEDURE — 36415 COLL VENOUS BLD VENIPUNCTURE: CPT | Performed by: PATHOLOGY

## 2024-07-30 PROCEDURE — 84550 ASSAY OF BLOOD/URIC ACID: CPT | Performed by: PATHOLOGY

## 2024-07-30 RX ORDER — ALLOPURINOL 100 MG/1
100 TABLET ORAL DAILY
Qty: 90 TABLET | Refills: 3 | Status: SHIPPED | OUTPATIENT
Start: 2024-07-30

## 2024-07-30 RX ORDER — ZOLPIDEM TARTRATE 10 MG/1
5 TABLET ORAL
Qty: 30 TABLET | Refills: 3 | Status: SHIPPED | OUTPATIENT
Start: 2024-07-30

## 2024-07-30 RX ORDER — SIMVASTATIN 10 MG
5 TABLET ORAL AT BEDTIME
Qty: 45 TABLET | Refills: 3 | Status: SHIPPED | OUTPATIENT
Start: 2024-07-30

## 2024-07-30 NOTE — PROGRESS NOTES
Assessment & Plan     Pain in right foot  - Uric acid; Future  - ESR: Erythrocyte sedimentation rate; Future  - XR Foot Right G/E 3 Views; Future  Results will be sent to patient with follow-up plan. Discussed avoiding  Allopurinol until symptoms recur with initial therapy of either prednisone or colchicine.    High cholesterol  - simvastatin (ZOCOR) 10 MG tablet; Take 0.5 tablets (5 mg) by mouth at bedtime Patient needs to see primary provider and have labs for further refills. Please call for an appointment at [phone].    Irritable bowel syndrome with diarrhea  - hyoscyamine (LEVSIN/SL) 0.125 MG sublingual tablet; Take 1 tablet (0.125 mg) by mouth every 4 hours as needed for cramping    Other insomnia  - zolpidem (AMBIEN) 10 MG tablet; Take 0.5 tablets (5 mg) by mouth nightly as needed for sleep     The longitudinal plan of care for the diagnosis(es)/condition(s) as documented were addressed during this visit. Due to the added complexity in care, I will continue to support Filippo in the subsequent management and with ongoing continuity of care.   I spent a total of   25   minutes in the care of this pt during today's office visit. This time includes reviewing the patient's chart and prior history, obtaining a history, performing an examination and evaluation and counseling the patient. This time also includes ordering medications or tests necessary in addition to communication to other member's of the patient's health care team. Time spent in documentation and care coordination is included.     Jyoti SHELTON, JUSTIN      Subjective   Filippo is a 48 year old, presenting for the following health issues:  RECHECK (Right foot pain/swelling, possible gout)        7/30/2024    10:25 AM   Additional Questions   Roomed by ARIEL, EMT     Pt was seen in [facility] 7/5/24 for right foot swelling and pain at the base of the second toe. His foot swelled to twice size. He used a boot and crutches for 3  "days and started colchicine until diarrhea started and then used NSAIDs.He achieved 90% relief fairly quickly.  Still has pain with retroflexion of the the second toe with pain in the MTP area.  He never had any labs down.  His father has a hx of gout.     History of Present Illness       Reason for visit:  Foot pain  Symptom onset:  3-4 weeks ago    He eats 2-3 servings of fruits and vegetables daily.He consumes 1 sweetened beverage(s) daily.He exercises with enough effort to increase his heart rate 20 to 29 minutes per day.  He exercises with enough effort to increase his heart rate 3 or less days per week. He is missing 2 dose(s) of medications per week.       There is no problem list on file for this patient.              Objective    /87 (BP Location: Right arm, Patient Position: Sitting, Cuff Size: Adult Regular)   Pulse 71   Ht 1.829 m (6' 0.01\")   Wt 85.1 kg (187 lb 9.6 oz)   SpO2 94%   BMI 25.44 kg/m    Body mass index is 25.44 kg/m .  Physical Exam   GENERAL: alert and no distress  MS: no gross musculoskeletal defects noted, no edema  ORTHO: See above.           Signed Electronically by: CAT Estes CNP    "

## 2024-07-30 NOTE — PATIENT INSTRUCTIONS
Gout: Care Instructions  Overview     Gout is a form of arthritis caused by a buildup of uric acid crystals in a joint. It causes sudden attacks of pain, swelling, redness, and stiffness, usually in one joint, especially the big toe.  Gout usually comes on without a cause. But it can be brought on by drinking alcohol (especially beer), eating or drinking things made with high-fructose corn syrup, or eating seafood or red meat. Taking certain medicines, such as diuretics, can also trigger an attack of gout.  Taking your medicines as prescribed and following up with your doctor regularly can help you avoid gout attacks in the future.  Follow-up care is a key part of your treatment and safety. Be sure to make and go to all appointments, and call your doctor if you are having problems. It's also a good idea to know your test results and keep a list of the medicines you take.  How can you care for yourself at home?  If the joint is swollen, put ice or a cold pack on the area for 10 to 20 minutes at a time. Put a thin cloth between the ice and your skin.  Prop up the sore limb on a pillow when you ice it or anytime you sit or lie down during the next 3 days. Try to keep it above the level of your heart. This can help reduce swelling.  Rest sore joints. Avoid activities that put weight or strain on the joints for a few days. Take short rest breaks from your regular activities during the day.  Take your medicines exactly as prescribed. Call your doctor if you think you are having a problem with your medicine.  Take pain medicines exactly as directed.  If the doctor gave you a prescription medicine for pain, take it as prescribed.  If you are not taking a prescription pain medicine, ask your doctor if you can take an over-the-counter medicine.  Eat less seafood and red meat.  Avoid foods or drinks that are made with high-fructose corn syrup.  Check with your doctor before drinking alcohol.  Losing weight, if you are  "overweight, may help reduce attacks of gout. But do not go on a diet that causes rapid weight loss. Losing a lot of weight in a short amount of time can cause a gout attack.  When should you call for help?   Call your doctor now or seek immediate medical care if:    You have a fever.     The joint is so painful you cannot use it.     You have sudden, unexplained swelling, redness, warmth, or severe pain in one or more joints.   Watch closely for changes in your health, and be sure to contact your doctor if:    You have joint pain.     Your symptoms get worse or are not improving after 2 or 3 days.   Where can you learn more?  Go to https://www.[employer].net/patiented  Enter E531 in the search box to learn more about \"Gout: Care Instructions.\"  Current as of: July 10, 2023               Content Version: 14.0    6385-9343 [employer].           "

## 2024-08-08 DIAGNOSIS — K58.0 IRRITABLE BOWEL SYNDROME WITH DIARRHEA: ICD-10-CM

## 2024-08-12 RX ORDER — CHOLESTYRAMINE LIGHT 4 G/5.7G
POWDER, FOR SUSPENSION ORAL
Qty: 231 G | Refills: 3 | Status: SHIPPED | OUTPATIENT
Start: 2024-08-12

## 2024-08-12 NOTE — TELEPHONE ENCOUNTER
cholestyramine light (PREVALITE) 4 GM powder       Last Written Prescription Date:  6-8-22  Last Fill Quantity: 231g,   # refills: 3  Last Office Visit : 7-30-24  Future Office visit:  none    Routing refill request to provider for review/approval because:  Med not on protocol  Gap in RF

## 2024-08-13 DIAGNOSIS — M79.671 PAIN IN RIGHT FOOT: Primary | ICD-10-CM

## 2024-08-15 RX ORDER — COLCHICINE 0.6 MG/1
0.6 TABLET ORAL DAILY
Qty: 30 TABLET | Refills: 1 | Status: SHIPPED | OUTPATIENT
Start: 2024-08-15

## 2024-08-15 NOTE — TELEPHONE ENCOUNTER
colchicine (COLCRYS) 0.6 MG tablet       Last Written Prescription Date:  not on med list  Last Fill Quantity: ,   # refills:   Last Office Visit : 7-30-24  Future Office visit:  none    Last clinic note  Pain in right foot  - Uric acid; Future  - ESR: Erythrocyte sedimentation rate; Future  - XR Foot Right G/E 3 Views; Future  Results will be sent to patient with follow-up plan. Discussed avoiding  Allopurinol until symptoms recur with initial therapy of either prednisone or colchicine.      Uric Acid   Date Value Ref Range Status   07/30/2024 7.9 (H) 3.4 - 7.0 mg/dL Final      Routing refill request to provider for review/approval because:  Drug not active on patient's medication list  ABN Uric acid  Overdue CBC, GFR

## 2024-09-03 ENCOUNTER — LAB (OUTPATIENT)
Dept: LAB | Facility: CLINIC | Age: 49
End: 2024-09-03
Attending: NURSE PRACTITIONER
Payer: COMMERCIAL

## 2024-09-03 DIAGNOSIS — T56.0X1A ACUTE LEAD-INDUCED GOUT INVOLVING TOE, UNSPECIFIED LATERALITY, INITIAL ENCOUNTER: ICD-10-CM

## 2024-09-03 DIAGNOSIS — M10.179 ACUTE LEAD-INDUCED GOUT INVOLVING TOE, UNSPECIFIED LATERALITY, INITIAL ENCOUNTER: ICD-10-CM

## 2024-09-03 LAB — URATE SERPL-MCNC: 5.5 MG/DL (ref 3.4–7)

## 2024-09-03 PROCEDURE — 84550 ASSAY OF BLOOD/URIC ACID: CPT | Performed by: PATHOLOGY

## 2024-09-03 PROCEDURE — 36415 COLL VENOUS BLD VENIPUNCTURE: CPT | Performed by: PATHOLOGY

## 2024-09-08 ENCOUNTER — HEALTH MAINTENANCE LETTER (OUTPATIENT)
Age: 49
End: 2024-09-08

## 2024-09-13 ENCOUNTER — TRANSFERRED RECORDS (OUTPATIENT)
Dept: HEALTH INFORMATION MANAGEMENT | Facility: CLINIC | Age: 49
End: 2024-09-13
Payer: COMMERCIAL

## 2024-09-13 LAB
ALT SERPL-CCNC: 31 IU/L (ref 9–46)
AST SERPL-CCNC: 34 U/L (ref 10–40)
CREATININE (EXTERNAL): 1.1 MG/DL (ref 0.6–1.35)

## 2024-11-15 ENCOUNTER — TRANSFERRED RECORDS (OUTPATIENT)
Dept: HEALTH INFORMATION MANAGEMENT | Facility: CLINIC | Age: 49
End: 2024-11-15
Payer: COMMERCIAL

## 2025-04-24 ENCOUNTER — TRANSFERRED RECORDS (OUTPATIENT)
Dept: HEALTH INFORMATION MANAGEMENT | Facility: CLINIC | Age: 50
End: 2025-04-24
Payer: COMMERCIAL

## 2025-05-19 ENCOUNTER — OFFICE VISIT (OUTPATIENT)
Dept: INTERNAL MEDICINE | Facility: CLINIC | Age: 50
End: 2025-05-19
Payer: COMMERCIAL

## 2025-05-19 VITALS
OXYGEN SATURATION: 97 % | HEART RATE: 80 BPM | WEIGHT: 192.3 LBS | BODY MASS INDEX: 26.05 KG/M2 | SYSTOLIC BLOOD PRESSURE: 151 MMHG | DIASTOLIC BLOOD PRESSURE: 99 MMHG | TEMPERATURE: 98.9 F | HEIGHT: 72 IN | RESPIRATION RATE: 16 BRPM

## 2025-05-19 DIAGNOSIS — Z13.1 SCREENING FOR DIABETES MELLITUS: ICD-10-CM

## 2025-05-19 DIAGNOSIS — R42 DIZZINESS: Primary | ICD-10-CM

## 2025-05-19 DIAGNOSIS — E78.00 HIGH CHOLESTEROL: ICD-10-CM

## 2025-05-19 DIAGNOSIS — I10 PRIMARY HYPERTENSION: ICD-10-CM

## 2025-05-19 PROCEDURE — 99214 OFFICE O/P EST MOD 30 MIN: CPT | Performed by: INTERNAL MEDICINE

## 2025-05-19 PROCEDURE — 3080F DIAST BP >= 90 MM HG: CPT | Performed by: INTERNAL MEDICINE

## 2025-05-19 PROCEDURE — 3077F SYST BP >= 140 MM HG: CPT | Performed by: INTERNAL MEDICINE

## 2025-05-19 RX ORDER — LOSARTAN POTASSIUM 25 MG/1
25 TABLET ORAL DAILY
Qty: 30 TABLET | Refills: 1 | Status: SHIPPED | OUTPATIENT
Start: 2025-05-19

## 2025-05-19 NOTE — PROGRESS NOTES
BP Readings from Last 6 Encounters:   05/19/25 (!) 151/99   07/30/24 130/87   08/30/22 111/70   06/08/22 135/85   12/05/19 138/89   06/06/17 129/81

## 2025-05-19 NOTE — PROGRESS NOTES
"  Assessment & Plan     Screening for cardiovascular condition  See Below    High cholesterol  Hx of high cholesterol  - Lipid panel reflex to direct LDL Non-fasting    Primary hypertension  Patient reports having at home BP of 140s-150s with home BP monitor and pharmacy BP monitor. Notes background of increased stress in personal life, but no other changes in diet, exercise, or other day-to-day activities. Will start ARB and obtain baseline K.  - losartan (COZAAR) 25 MG tablet  Dispense: 30 tablet; Refill: 1  - Basic metabolic panel  (Ca, Cl, CO2, Creat, Gluc, K, Na, BUN)    BMI  Estimated body mass index is 25.44 kg/m  as calculated from the following:    Height as of 7/30/24: 1.829 m (6' 0.01\").    Weight as of 7/30/24: 85.1 kg (187 lb 9.6 oz).     Jamey Barkley is a 49 year old, presenting for the following health issues:  No chief complaint on file.    Filippo Saavedra is a 49 year old male with pmhx of gout, hyperlipidemia, and gerd who presents to clinic for concerns of dizziness and hypertension.  Regarding his dizziness, this began around a month ago, shortly after doubling his allopurinol dose from 100 mg to 200 mg as he was having gout episodes. Clarifies that he does not neccessary think the medication increase caused his symptoms but just happened to be timing wise. Characterizes the dizziness as not exactly a room-spinning sensation, but that he feels a bit unsteady on his feet. Has episodes of feeling unwell that last several hours at a time. Last two episodes happened today and 3 days ago. Does not seem to be an exact trigger for symptoms and no alleviating factors as well. He denies any lightheadedness as well as recent fevers, chills, URI symptoms, tinnitus, hearing loss, migraines, photophobia, phonophobia, palpitations, chest pain, shortness of breath, unilateral weakness, numbness.  Regarding his high blood pressure, states in the past, the highest his BP would get would be ~130. However with " his at home monitor and at the pharmacy blood pressure machine, he had recorded pressures from the 140s-150s. Notes his personal life has been quite stressful recently, but otherwise has not changed day-to-day activities including food intake, salt intake, exercise, or drinking (has remained ~once a week).        Review of Systems  Constitutional, HEENT, CV, pulmonary, GI, MSK, derm, neuro, and psych systems are negative, except as otherwise noted.       Objective    There were no vitals taken for this visit.  There is no height or weight on file to calculate BMI.  Physical Exam   GENERAL: alert and no distress  NECK: no adenopathy, no asymmetry, masses, or scars  RESP: lungs clear to auscultation - no rales, rhonchi or wheezes  CV: regular rate and rhythm, normal S1 S2, no S3 or S4, no murmur, click or rub, no peripheral edema  MS: no gross musculoskeletal defects noted, no edema  NEURO: Normal strength and tone, mentation intact and speech normal. CN I-XII intact. Normal FNF, normal heel to shin test. Normal gait. 5/5 Strength and sensation intact and symmetric to all 4 extremities.        Dheeraj Chandler, MS2    Signed Electronically by: Michael Matthews MD

## 2025-06-13 ENCOUNTER — RESULTS FOLLOW-UP (OUTPATIENT)
Dept: INTERNAL MEDICINE | Facility: CLINIC | Age: 50
End: 2025-06-13

## 2025-06-13 PROBLEM — I10 PRIMARY HYPERTENSION: Status: ACTIVE | Noted: 2025-06-13

## 2025-06-16 ENCOUNTER — PATIENT OUTREACH (OUTPATIENT)
Dept: CARE COORDINATION | Facility: CLINIC | Age: 50
End: 2025-06-16
Payer: COMMERCIAL

## 2025-06-18 ENCOUNTER — PATIENT OUTREACH (OUTPATIENT)
Dept: CARE COORDINATION | Facility: CLINIC | Age: 50
End: 2025-06-18
Payer: COMMERCIAL

## 2025-07-01 ENCOUNTER — LAB (OUTPATIENT)
Dept: LAB | Facility: CLINIC | Age: 50
End: 2025-07-01
Payer: COMMERCIAL

## 2025-07-01 ENCOUNTER — RESULTS FOLLOW-UP (OUTPATIENT)
Dept: INTERNAL MEDICINE | Facility: CLINIC | Age: 50
End: 2025-07-01

## 2025-07-01 DIAGNOSIS — I10 PRIMARY HYPERTENSION: ICD-10-CM

## 2025-07-01 DIAGNOSIS — I10 PRIMARY HYPERTENSION: Primary | ICD-10-CM

## 2025-07-01 LAB
CREAT SERPL-MCNC: 1.02 MG/DL (ref 0.67–1.17)
EGFRCR SERPLBLD CKD-EPI 2021: 89 ML/MIN/1.73M2
POTASSIUM SERPL-SCNC: 4.1 MMOL/L (ref 3.4–5.3)

## 2025-07-01 PROCEDURE — 82565 ASSAY OF CREATININE: CPT | Performed by: PATHOLOGY

## 2025-07-01 PROCEDURE — 84132 ASSAY OF SERUM POTASSIUM: CPT | Performed by: PATHOLOGY

## 2025-07-01 PROCEDURE — 36415 COLL VENOUS BLD VENIPUNCTURE: CPT | Performed by: PATHOLOGY

## 2025-07-02 RX ORDER — AMLODIPINE BESYLATE 5 MG/1
5 TABLET ORAL DAILY
Qty: 90 TABLET | Refills: 3 | Status: SHIPPED | OUTPATIENT
Start: 2025-07-02

## (undated) DEVICE — GOWN IMPERVIOUS 2XL BLUE

## (undated) DEVICE — TUBING SUCTION 12"X1/4" N612

## (undated) DEVICE — SNARE CAPIVATOR ROUND COLD SNR BX10 M00561101

## (undated) DEVICE — KIT ENDO TURNOVER/PROCEDURE CARRY-ON 101822

## (undated) DEVICE — SPECIMEN CONTAINER 3OZ W/FORMALIN 59901

## (undated) DEVICE — ENDO FORCEP BX CAPTURA PRO SPIKE G50696

## (undated) DEVICE — SOL WATER IRRIG 500ML BOTTLE 2F7113

## (undated) DEVICE — SUCTION MANIFOLD NEPTUNE 2 SYS 1 PORT 702-025-000

## (undated) DEVICE — ENDO FORCEP SPIKED SERRATED SHAFT JUMBO 239CM G56998